# Patient Record
Sex: FEMALE | Race: BLACK OR AFRICAN AMERICAN | NOT HISPANIC OR LATINO | Employment: FULL TIME | URBAN - METROPOLITAN AREA
[De-identification: names, ages, dates, MRNs, and addresses within clinical notes are randomized per-mention and may not be internally consistent; named-entity substitution may affect disease eponyms.]

---

## 2017-01-25 ENCOUNTER — HOSPITAL ENCOUNTER (EMERGENCY)
Facility: HOSPITAL | Age: 27
Discharge: LEFT BEFORE DISCHARGE | End: 2017-01-25
Payer: MEDICAID

## 2017-01-25 VITALS
SYSTOLIC BLOOD PRESSURE: 112 MMHG | BODY MASS INDEX: 21.99 KG/M2 | HEART RATE: 115 BPM | OXYGEN SATURATION: 98 % | HEIGHT: 60 IN | DIASTOLIC BLOOD PRESSURE: 61 MMHG | WEIGHT: 112 LBS | RESPIRATION RATE: 20 BRPM | TEMPERATURE: 102 F

## 2017-01-25 LAB
FLUAV AG SPEC QL IA: NEGATIVE
FLUBV AG SPEC QL IA: NEGATIVE
SPECIMEN SOURCE: NORMAL

## 2017-01-25 PROCEDURE — 87400 INFLUENZA A/B EACH AG IA: CPT

## 2017-01-25 PROCEDURE — 99900041 HC LEFT WITHOUT BEING SEEN- EMERGENCY

## 2017-01-25 RX ORDER — ACETAMINOPHEN 500 MG
1000 TABLET ORAL
Status: DISCONTINUED | OUTPATIENT
Start: 2017-01-25 | End: 2017-01-26 | Stop reason: HOSPADM

## 2017-10-08 ENCOUNTER — HOSPITAL ENCOUNTER (EMERGENCY)
Facility: HOSPITAL | Age: 27
Discharge: HOME | End: 2017-10-08
Attending: FAMILY MEDICINE
Payer: MEDICAID

## 2017-10-08 VITALS
RESPIRATION RATE: 20 BRPM | OXYGEN SATURATION: 100 % | WEIGHT: 153 LBS | BODY MASS INDEX: 30.04 KG/M2 | HEART RATE: 90 BPM | TEMPERATURE: 98 F | SYSTOLIC BLOOD PRESSURE: 114 MMHG | HEIGHT: 60 IN | DIASTOLIC BLOOD PRESSURE: 75 MMHG

## 2017-10-08 DIAGNOSIS — O12.03 EDEMA DURING PREGNANCY IN THIRD TRIMESTER: Primary | ICD-10-CM

## 2017-10-08 PROCEDURE — 99283 EMERGENCY DEPT VISIT LOW MDM: CPT

## 2017-10-09 NOTE — ED PROVIDER NOTES
Encounter Date: 10/8/2017       History     Chief Complaint   Patient presents with    Leg Swelling     Pt states started with BLE swelling and lower back pain x 3 days.  Pt is 34 weeks gestation, states stands for 8 hours at work,  Pt reported symptoms to OB/GYN at last appt. Denies gush of fluids, states has been on antibiotics for bacterial vaginosis.      Mild edema at 34 weeks pregnant      The history is provided by the patient.   Leg Pain    The incident occurred at home. There was no injury mechanism. The incident occurred several days ago. The pain location is generalized. The pain is at a severity of 4/10. The pain has been constant since onset. The symptoms are aggravated by bearing weight.     Review of patient's allergies indicates:  No Known Allergies  History reviewed. No pertinent past medical history.  Past Surgical History:   Procedure Laterality Date    BREAST SURGERY       SECTION, CLASSIC       Family History   Problem Relation Age of Onset    Diabetes Mother     Hypertension Father     Diabetes Father      Social History   Substance Use Topics    Smoking status: Never Smoker    Smokeless tobacco: Never Used    Alcohol use No     Review of Systems   Constitutional: Negative for fever.   HENT: Negative for sore throat.    Respiratory: Negative for shortness of breath.    Cardiovascular: Negative for chest pain.   Gastrointestinal: Negative for nausea.   Genitourinary: Negative for dysuria.   Musculoskeletal: Negative for back pain.   Skin: Negative for rash.   Neurological: Negative for weakness.   Hematological: Does not bruise/bleed easily.   All other systems reviewed and are negative.      Physical Exam     Initial Vitals [10/08/17 1844]   BP Pulse Resp Temp SpO2   120/72 94 16 98.5 °F (36.9 °C) 98 %      MAP       88         Physical Exam    Constitutional: She appears well-developed.   HENT:   Head: Normocephalic and atraumatic.   Right Ear: External ear normal.   Left Ear:  External ear normal.   Nose: Nose normal.   Mouth/Throat: Oropharynx is clear and moist.   Eyes: Conjunctivae and EOM are normal. Pupils are equal, round, and reactive to light. Right eye exhibits no discharge. Left eye exhibits no discharge.   Neck: Normal range of motion. Neck supple. No tracheal deviation present.   Cardiovascular: Normal rate, regular rhythm and normal heart sounds.   No murmur heard.  Pulmonary/Chest: Breath sounds normal. No respiratory distress. She has no wheezes.   Abdominal: Soft. Bowel sounds are normal.   Musculoskeletal: She exhibits edema.   +2 edema bilateral lower legs   Neurological: She is alert and oriented to person, place, and time.   Skin: Skin is warm and dry.         ED Course   Procedures  Labs Reviewed - No data to display          Medical Decision Making:   Initial Assessment:   Patient sitting in no distress and pleasant. Patient has no other complaints other than documented.     Differential Diagnosis:   Edema  injury                   ED Course      Clinical Impression:   The encounter diagnosis was Edema during pregnancy in third trimester.                           Kodak Hurley MD  10/08/17 1919

## 2023-07-18 ENCOUNTER — HOSPITAL ENCOUNTER (OUTPATIENT)
Facility: HOSPITAL | Age: 33
Discharge: HOME | DRG: 690 | End: 2023-07-21
Attending: EMERGENCY MEDICINE | Admitting: HOSPITALIST
Payer: MEDICAID

## 2023-07-18 DIAGNOSIS — R50.9 FEVER, UNSPECIFIED FEVER CAUSE: ICD-10-CM

## 2023-07-18 DIAGNOSIS — N39.0 URINARY TRACT INFECTION WITH HEMATURIA, SITE UNSPECIFIED: ICD-10-CM

## 2023-07-18 DIAGNOSIS — R07.9 CHEST PAIN: ICD-10-CM

## 2023-07-18 DIAGNOSIS — R31.9 URINARY TRACT INFECTION WITH HEMATURIA, SITE UNSPECIFIED: ICD-10-CM

## 2023-07-18 DIAGNOSIS — A41.9 SEPSIS, DUE TO UNSPECIFIED ORGANISM, UNSPECIFIED WHETHER ACUTE ORGAN DYSFUNCTION PRESENT: Primary | ICD-10-CM

## 2023-07-18 PROBLEM — E87.6 HYPOKALEMIA: Status: ACTIVE | Noted: 2023-07-18

## 2023-07-18 LAB
ALBUMIN SERPL BCP-MCNC: 3.9 G/DL (ref 3.5–5.2)
ALP SERPL-CCNC: 109 U/L (ref 38–126)
ALT SERPL W/O P-5'-P-CCNC: 14 U/L (ref 10–44)
ANION GAP SERPL CALC-SCNC: 12 MMOL/L (ref 8–16)
AST SERPL-CCNC: 18 U/L (ref 15–46)
B-HCG UR QL: NEGATIVE
BACTERIA #/AREA URNS AUTO: ABNORMAL /HPF
BASOPHILS # BLD AUTO: 0.06 K/UL (ref 0–0.2)
BASOPHILS # BLD AUTO: 0.07 K/UL (ref 0–0.2)
BASOPHILS NFR BLD: 0.3 % (ref 0–1.9)
BASOPHILS NFR BLD: 0.4 % (ref 0–1.9)
BILIRUB SERPL-MCNC: 0.4 MG/DL (ref 0.1–1)
BILIRUB UR QL STRIP: NEGATIVE
CALCIUM SERPL-MCNC: 8.8 MG/DL (ref 8.7–10.5)
CHLORIDE SERPL-SCNC: 106 MMOL/L (ref 95–110)
CLARITY UR REFRACT.AUTO: ABNORMAL
CO2 SERPL-SCNC: 21 MMOL/L (ref 23–29)
COLOR UR AUTO: YELLOW
CREAT SERPL-MCNC: 0.68 MG/DL (ref 0.5–1.4)
CTP QC/QA: YES
DIFFERENTIAL METHOD: ABNORMAL
DIFFERENTIAL METHOD: ABNORMAL
EOSINOPHIL # BLD AUTO: 0 K/UL (ref 0–0.5)
EOSINOPHIL # BLD AUTO: 0 K/UL (ref 0–0.5)
EOSINOPHIL NFR BLD: 0 % (ref 0–8)
EOSINOPHIL NFR BLD: 0.1 % (ref 0–8)
ERYTHROCYTE [DISTWIDTH] IN BLOOD BY AUTOMATED COUNT: 16.1 % (ref 11.5–14.5)
ERYTHROCYTE [DISTWIDTH] IN BLOOD BY AUTOMATED COUNT: 16.2 % (ref 11.5–14.5)
EST. GFR  (NO RACE VARIABLE): >60 ML/MIN/1.73 M^2
GLUCOSE SERPL-MCNC: 124 MG/DL (ref 70–110)
GLUCOSE UR QL STRIP: NEGATIVE
HCT VFR BLD AUTO: 25 % (ref 37–48.5)
HCT VFR BLD AUTO: 27 % (ref 37–48.5)
HGB BLD-MCNC: 7.9 G/DL (ref 12–16)
HGB BLD-MCNC: 8.8 G/DL (ref 12–16)
HGB UR QL STRIP: ABNORMAL
HYALINE CASTS UR QL AUTO: 0 /LPF
IMM GRANULOCYTES # BLD AUTO: 0.11 K/UL (ref 0–0.04)
IMM GRANULOCYTES # BLD AUTO: 0.15 K/UL (ref 0–0.04)
IMM GRANULOCYTES NFR BLD AUTO: 0.6 % (ref 0–0.5)
IMM GRANULOCYTES NFR BLD AUTO: 0.8 % (ref 0–0.5)
INFLUENZA A, MOLECULAR: NEGATIVE
INFLUENZA B, MOLECULAR: NEGATIVE
KETONES UR QL STRIP: NEGATIVE
LACTATE SERPL-SCNC: 1.1 MMOL/L (ref 0.5–2.2)
LEUKOCYTE ESTERASE UR QL STRIP: NEGATIVE
LIPASE SERPL-CCNC: 15 U/L (ref 23–300)
LYMPHOCYTES # BLD AUTO: 0.8 K/UL (ref 1–4.8)
LYMPHOCYTES # BLD AUTO: 1.1 K/UL (ref 1–4.8)
LYMPHOCYTES NFR BLD: 4 % (ref 18–48)
LYMPHOCYTES NFR BLD: 5.4 % (ref 18–48)
MAGNESIUM SERPL-MCNC: 1.6 MG/DL (ref 1.6–2.6)
MCH RBC QN AUTO: 24.6 PG (ref 27–31)
MCH RBC QN AUTO: 25.1 PG (ref 27–31)
MCHC RBC AUTO-ENTMCNC: 31.6 G/DL (ref 32–36)
MCHC RBC AUTO-ENTMCNC: 32.6 G/DL (ref 32–36)
MCV RBC AUTO: 77 FL (ref 82–98)
MCV RBC AUTO: 78 FL (ref 82–98)
MICROSCOPIC COMMENT: ABNORMAL
MONOCYTES # BLD AUTO: 1.2 K/UL (ref 0.3–1)
MONOCYTES # BLD AUTO: 1.3 K/UL (ref 0.3–1)
MONOCYTES NFR BLD: 6.1 % (ref 4–15)
MONOCYTES NFR BLD: 6.5 % (ref 4–15)
NEUTROPHILS # BLD AUTO: 16.9 K/UL (ref 1.8–7.7)
NEUTROPHILS # BLD AUTO: 17.8 K/UL (ref 1.8–7.7)
NEUTROPHILS NFR BLD: 87 % (ref 38–73)
NEUTROPHILS NFR BLD: 88.8 % (ref 38–73)
NITRITE UR QL STRIP: NEGATIVE
NRBC BLD-RTO: 0 /100 WBC
NRBC BLD-RTO: 0 /100 WBC
PH UR STRIP: 6 [PH] (ref 5–8)
PLATELET # BLD AUTO: 458 K/UL (ref 150–450)
PLATELET # BLD AUTO: 513 K/UL (ref 150–450)
PMV BLD AUTO: 10.9 FL (ref 9.2–12.9)
PMV BLD AUTO: 11.1 FL (ref 9.2–12.9)
POTASSIUM SERPL-SCNC: 3.1 MMOL/L (ref 3.5–5.1)
POTASSIUM SERPL-SCNC: 3.6 MMOL/L (ref 3.5–5.1)
PROT SERPL-MCNC: 7.3 G/DL (ref 6–8.4)
PROT UR QL STRIP: ABNORMAL
RBC # BLD AUTO: 3.21 M/UL (ref 4–5.4)
RBC # BLD AUTO: 3.5 M/UL (ref 4–5.4)
RBC #/AREA URNS AUTO: 15 /HPF (ref 0–4)
SARS-COV-2 RDRP RESP QL NAA+PROBE: NEGATIVE
SODIUM SERPL-SCNC: 139 MMOL/L (ref 136–145)
SP GR UR STRIP: 1.01 (ref 1–1.03)
SPECIMEN SOURCE: NORMAL
URN SPEC COLLECT METH UR: ABNORMAL
UROBILINOGEN UR STRIP-ACNC: NEGATIVE EU/DL
UUN UR-MCNC: 3 MG/DL (ref 7–17)
WBC # BLD AUTO: 19.47 K/UL (ref 3.9–12.7)
WBC # BLD AUTO: 19.97 K/UL (ref 3.9–12.7)
WBC #/AREA URNS AUTO: 25 /HPF (ref 0–5)
WBC CLUMPS UR QL AUTO: ABNORMAL

## 2023-07-18 PROCEDURE — 87088 URINE BACTERIA CULTURE: CPT | Mod: ER | Performed by: EMERGENCY MEDICINE

## 2023-07-18 PROCEDURE — G0378 HOSPITAL OBSERVATION PER HR: HCPCS

## 2023-07-18 PROCEDURE — 81000 URINALYSIS NONAUTO W/SCOPE: CPT | Mod: ER | Performed by: EMERGENCY MEDICINE

## 2023-07-18 PROCEDURE — 99285 EMERGENCY DEPT VISIT HI MDM: CPT | Mod: 25,ER

## 2023-07-18 PROCEDURE — 25000003 PHARM REV CODE 250: Mod: ER | Performed by: EMERGENCY MEDICINE

## 2023-07-18 PROCEDURE — 83735 ASSAY OF MAGNESIUM: CPT | Performed by: NURSE PRACTITIONER

## 2023-07-18 PROCEDURE — 80053 COMPREHEN METABOLIC PANEL: CPT | Mod: ER | Performed by: EMERGENCY MEDICINE

## 2023-07-18 PROCEDURE — 63600175 PHARM REV CODE 636 W HCPCS: Mod: ER | Performed by: EMERGENCY MEDICINE

## 2023-07-18 PROCEDURE — 87086 URINE CULTURE/COLONY COUNT: CPT | Mod: ER | Performed by: EMERGENCY MEDICINE

## 2023-07-18 PROCEDURE — 96361 HYDRATE IV INFUSION ADD-ON: CPT | Mod: ER

## 2023-07-18 PROCEDURE — 81025 URINE PREGNANCY TEST: CPT | Mod: ER | Performed by: EMERGENCY MEDICINE

## 2023-07-18 PROCEDURE — 85025 COMPLETE CBC W/AUTO DIFF WBC: CPT | Mod: ER | Performed by: EMERGENCY MEDICINE

## 2023-07-18 PROCEDURE — 83605 ASSAY OF LACTIC ACID: CPT | Mod: ER | Performed by: EMERGENCY MEDICINE

## 2023-07-18 PROCEDURE — 87077 CULTURE AEROBIC IDENTIFY: CPT | Mod: ER | Performed by: EMERGENCY MEDICINE

## 2023-07-18 PROCEDURE — 11000001 HC ACUTE MED/SURG PRIVATE ROOM

## 2023-07-18 PROCEDURE — 36415 COLL VENOUS BLD VENIPUNCTURE: CPT | Performed by: NURSE PRACTITIONER

## 2023-07-18 PROCEDURE — 25500020 PHARM REV CODE 255: Mod: ER | Performed by: EMERGENCY MEDICINE

## 2023-07-18 PROCEDURE — 82728 ASSAY OF FERRITIN: CPT | Performed by: NURSE PRACTITIONER

## 2023-07-18 PROCEDURE — 83690 ASSAY OF LIPASE: CPT | Mod: ER | Performed by: EMERGENCY MEDICINE

## 2023-07-18 PROCEDURE — 96366 THER/PROPH/DIAG IV INF ADDON: CPT | Mod: ER

## 2023-07-18 PROCEDURE — 87502 INFLUENZA DNA AMP PROBE: CPT | Mod: ER | Performed by: EMERGENCY MEDICINE

## 2023-07-18 PROCEDURE — 87040 BLOOD CULTURE FOR BACTERIA: CPT | Mod: ER | Performed by: EMERGENCY MEDICINE

## 2023-07-18 PROCEDURE — 84466 ASSAY OF TRANSFERRIN: CPT | Performed by: NURSE PRACTITIONER

## 2023-07-18 PROCEDURE — 87186 SC STD MICRODIL/AGAR DIL: CPT | Mod: ER | Performed by: EMERGENCY MEDICINE

## 2023-07-18 PROCEDURE — 96365 THER/PROPH/DIAG IV INF INIT: CPT | Mod: 59,ER

## 2023-07-18 PROCEDURE — 25000003 PHARM REV CODE 250: Performed by: NURSE PRACTITIONER

## 2023-07-18 PROCEDURE — 84132 ASSAY OF SERUM POTASSIUM: CPT | Performed by: NURSE PRACTITIONER

## 2023-07-18 PROCEDURE — U0002 COVID-19 LAB TEST NON-CDC: HCPCS | Mod: ER | Performed by: EMERGENCY MEDICINE

## 2023-07-18 PROCEDURE — 85025 COMPLETE CBC W/AUTO DIFF WBC: CPT | Mod: 91,ER | Performed by: EMERGENCY MEDICINE

## 2023-07-18 RX ORDER — IBUPROFEN 600 MG/1
600 TABLET ORAL
Status: COMPLETED | OUTPATIENT
Start: 2023-07-18 | End: 2023-07-18

## 2023-07-18 RX ORDER — SODIUM CHLORIDE 9 MG/ML
1000 INJECTION, SOLUTION INTRAVENOUS
Status: COMPLETED | OUTPATIENT
Start: 2023-07-18 | End: 2023-07-18

## 2023-07-18 RX ORDER — SODIUM CHLORIDE 0.9 % (FLUSH) 0.9 %
3 SYRINGE (ML) INJECTION EVERY 12 HOURS PRN
Status: DISCONTINUED | OUTPATIENT
Start: 2023-07-18 | End: 2023-07-21 | Stop reason: HOSPADM

## 2023-07-18 RX ORDER — TALC
6 POWDER (GRAM) TOPICAL NIGHTLY PRN
Status: DISCONTINUED | OUTPATIENT
Start: 2023-07-18 | End: 2023-07-21 | Stop reason: HOSPADM

## 2023-07-18 RX ORDER — GLUCAGON 1 MG
1 KIT INJECTION
Status: DISCONTINUED | OUTPATIENT
Start: 2023-07-18 | End: 2023-07-21 | Stop reason: HOSPADM

## 2023-07-18 RX ORDER — POTASSIUM CHLORIDE 20 MEQ/1
40 TABLET, EXTENDED RELEASE ORAL
Status: COMPLETED | OUTPATIENT
Start: 2023-07-18 | End: 2023-07-18

## 2023-07-18 RX ORDER — ACETAMINOPHEN 325 MG/1
650 TABLET ORAL EVERY 4 HOURS PRN
Status: DISCONTINUED | OUTPATIENT
Start: 2023-07-18 | End: 2023-07-21 | Stop reason: HOSPADM

## 2023-07-18 RX ORDER — IBUPROFEN 200 MG
16 TABLET ORAL
Status: DISCONTINUED | OUTPATIENT
Start: 2023-07-18 | End: 2023-07-21 | Stop reason: HOSPADM

## 2023-07-18 RX ORDER — ONDANSETRON 2 MG/ML
4 INJECTION INTRAMUSCULAR; INTRAVENOUS EVERY 8 HOURS PRN
Status: DISCONTINUED | OUTPATIENT
Start: 2023-07-18 | End: 2023-07-21 | Stop reason: HOSPADM

## 2023-07-18 RX ORDER — IBUPROFEN 200 MG
24 TABLET ORAL
Status: DISCONTINUED | OUTPATIENT
Start: 2023-07-18 | End: 2023-07-21 | Stop reason: HOSPADM

## 2023-07-18 RX ORDER — POTASSIUM CHLORIDE 20 MEQ/1
40 TABLET, EXTENDED RELEASE ORAL ONCE
Status: COMPLETED | OUTPATIENT
Start: 2023-07-19 | End: 2023-07-19

## 2023-07-18 RX ORDER — CEPHALEXIN 500 MG/1
1000 CAPSULE ORAL EVERY 12 HOURS
Qty: 28 CAPSULE | Refills: 0 | Status: SHIPPED | OUTPATIENT
Start: 2023-07-18 | End: 2023-07-21 | Stop reason: HOSPADM

## 2023-07-18 RX ORDER — NALOXONE HCL 0.4 MG/ML
0.02 VIAL (ML) INJECTION
Status: DISCONTINUED | OUTPATIENT
Start: 2023-07-18 | End: 2023-07-21 | Stop reason: HOSPADM

## 2023-07-18 RX ORDER — SODIUM CHLORIDE 9 MG/ML
INJECTION, SOLUTION INTRAVENOUS
Status: COMPLETED | OUTPATIENT
Start: 2023-07-18 | End: 2023-07-18

## 2023-07-18 RX ADMIN — IOHEXOL 100 ML: 350 INJECTION, SOLUTION INTRAVENOUS at 03:07

## 2023-07-18 RX ADMIN — CEFTRIAXONE SODIUM 2 G: 2 INJECTION, POWDER, FOR SOLUTION INTRAMUSCULAR; INTRAVENOUS at 04:07

## 2023-07-18 RX ADMIN — VANCOMYCIN HYDROCHLORIDE 1500 MG: 1 INJECTION, POWDER, LYOPHILIZED, FOR SOLUTION INTRAVENOUS at 05:07

## 2023-07-18 RX ADMIN — IBUPROFEN 600 MG: 600 TABLET ORAL at 02:07

## 2023-07-18 RX ADMIN — POTASSIUM CHLORIDE 40 MEQ: 1500 TABLET, EXTENDED RELEASE ORAL at 08:07

## 2023-07-18 RX ADMIN — SODIUM CHLORIDE: 9 INJECTION, SOLUTION INTRAVENOUS at 02:07

## 2023-07-18 RX ADMIN — SODIUM CHLORIDE 1000 ML: 9 INJECTION, SOLUTION INTRAVENOUS at 05:07

## 2023-07-18 RX ADMIN — Medication 6 MG: at 10:07

## 2023-07-18 NOTE — ED PROVIDER NOTES
Encounter Date: 2023       History     Chief Complaint   Patient presents with    Fever    Fatigue     Headache, fever, weakness, emesis and leg numbness that started Saturday. Tylenol taken 2 hours ago     33-year-old female presenting with headache, fever, vomiting, weakness and numbness in her legs.  Patient says symptoms initially started Friday, got better but then returned yesterday more severely.  Patient describes her leg numbness as tingling with some pain associated with it.  Took Tylenol for fever.  Denies sore throat, chest pain, shortness of breath.  Patient says she is on her menstrual cycle now so has abdominal cramping.  Denies dysuria.  No vaginal discharge otherwise.  No rash.  No loss of control of bowels or bladder.  No back pain.    Review of patient's allergies indicates:  No Known Allergies  History reviewed. No pertinent past medical history.  Past Surgical History:   Procedure Laterality Date    BREAST SURGERY       SECTION, CLASSIC      TUBAL LIGATION       Family History   Problem Relation Age of Onset    Diabetes Mother     Hypertension Father     Diabetes Father      Social History     Tobacco Use    Smoking status: Never    Smokeless tobacco: Never   Substance Use Topics    Alcohol use: No    Drug use: No     Review of Systems   Constitutional:  Positive for fatigue and fever.   HENT:  Negative for sore throat.    Eyes:  Negative for pain.   Respiratory:  Negative for shortness of breath.    Cardiovascular:  Negative for chest pain.   Gastrointestinal:  Positive for abdominal pain. Negative for nausea and vomiting.   Genitourinary:  Negative for difficulty urinating.   Musculoskeletal:  Positive for myalgias. Negative for back pain and neck pain.   Neurological:  Positive for weakness, numbness and headaches.   Psychiatric/Behavioral:  Negative for confusion.      Physical Exam     Initial Vitals [23 1359]   BP Pulse Resp Temp SpO2   124/80 (!) 123 20 (!) 103.1 °F  (39.5 °C) 98 %      MAP       --         Physical Exam    Nursing note and vitals reviewed.  HENT:   Head: Atraumatic.   Mouth/Throat: Oropharynx is clear and moist.   Eyes: Conjunctivae and EOM are normal.   Neck: Neck supple.   Normal range of motion.  Cardiovascular:      Exam reveals no gallop and no friction rub.       No murmur heard.  Tachycardic   Pulmonary/Chest: Breath sounds normal. No respiratory distress.   Abdominal: Abdomen is soft. She exhibits no distension. There is abdominal tenderness (diffuse). There is no rebound.   Musculoskeletal:         General: No tenderness. Normal range of motion.      Cervical back: Normal range of motion and neck supple.     Neurological: She is alert and oriented to person, place, and time. She has normal strength. No cranial nerve deficit or sensory deficit.   Psychiatric: She has a normal mood and affect.       ED Course   Critical Care    Date/Time: 7/18/2023 5:50 PM  Performed by: Sammy Xavier MD  Authorized by: Sammy Xavier MD   Direct patient critical care time: 35 minutes  Ordering / reviewing critical care time: 10 minutes  Documentation critical care time: 10 minutes  Consulting other physicians critical care time: 5 minutes  Total critical care time (exclusive of procedural time) : 60 minutes  Critical care was necessary to treat or prevent imminent or life-threatening deterioration of the following conditions: shock, sepsis and renal failure.  Critical care was time spent personally by me on the following activities: development of treatment plan with patient or surrogate, evaluation of patient's response to treatment, examination of patient, obtaining history from patient or surrogate, ordering and performing treatments and interventions, ordering and review of laboratory studies, ordering and review of radiographic studies, pulse oximetry, re-evaluation of patient's condition and review of old charts.      Labs Reviewed   COMPREHENSIVE METABOLIC  PANEL - Abnormal; Notable for the following components:       Result Value    Potassium 3.1 (*)     CO2 21 (*)     Glucose 124 (*)     BUN 3 (*)     All other components within normal limits   CBC W/ AUTO DIFFERENTIAL - Abnormal; Notable for the following components:    WBC 19.97 (*)     RBC 3.50 (*)     Hemoglobin 8.8 (*)     Hematocrit 27.0 (*)     MCV 77 (*)     MCH 25.1 (*)     RDW 16.1 (*)     Platelets 513 (*)     Immature Granulocytes 0.6 (*)     Gran # (ANC) 17.8 (*)     Immature Grans (Abs) 0.11 (*)     Lymph # 0.8 (*)     Mono # 1.2 (*)     Gran % 88.8 (*)     Lymph % 4.0 (*)     All other components within normal limits   URINALYSIS, REFLEX TO URINE CULTURE - Abnormal; Notable for the following components:    Appearance, UA Hazy (*)     Protein, UA 1+ (*)     Occult Blood UA 2+ (*)     All other components within normal limits    Narrative:     Preferred Collection Type->Urine, Clean Catch  Specimen Source->Urine   LIPASE - Abnormal; Notable for the following components:    Lipase Result 15 (*)     All other components within normal limits   URINALYSIS MICROSCOPIC - Abnormal; Notable for the following components:    RBC, UA 15 (*)     WBC, UA 25 (*)     WBC Clumps, UA Occasional (*)     Bacteria Moderate (*)     All other components within normal limits    Narrative:     Preferred Collection Type->Urine, Clean Catch  Specimen Source->Urine   CBC W/ AUTO DIFFERENTIAL - Abnormal; Notable for the following components:    WBC 19.47 (*)     RBC 3.21 (*)     Hemoglobin 7.9 (*)     Hematocrit 25.0 (*)     MCV 78 (*)     MCH 24.6 (*)     MCHC 31.6 (*)     RDW 16.2 (*)     Platelets 458 (*)     Immature Granulocytes 0.8 (*)     Gran # (ANC) 16.9 (*)     Immature Grans (Abs) 0.15 (*)     Mono # 1.3 (*)     Gran % 87.0 (*)     Lymph % 5.4 (*)     All other components within normal limits   INFLUENZA A & B BY MOLECULAR   CULTURE, URINE   SARS-COV-2 RNA AMPLIFICATION, QUAL    Narrative:     Is the patient  symptomatic?->Yes   LACTIC ACID, PLASMA   POCT URINE PREGNANCY          Imaging Results              US Pelvis Complete Non OB (Final result)  Result time 07/18/23 17:01:39      Final result by Nury Courtney MD (Timothy) (07/18/23 17:01:39)                   Impression:      Partially collapse 1.5 cm follicle involving the right ovary.  Vascularity is present involving both ovaries.  O rad 1.      Electronically signed by: Nury Courtney MD  Date:    07/18/2023  Time:    17:01               Narrative:    EXAMINATION:  US PELVIS COMPLETE NON OB    CLINICAL HISTORY:  abnormal adnexa on CT, r/o TOA;    COMPARISON:  CT scan of the abdomen pelvis, 07/18/2023.    FINDINGS:  The uterus appears normal.  it measures 9.5 cm in length.  The endometrium measures 3 mm in thickness.    The right ovary measures 3.5 x 2.0 x 2.4 cm.  A 1.5 cm follicle is present.  May be partially collapsed.  Vascularity is present.    The left ovary measures 3.2 x 1.4 x 1.9 cm.  Vascularity is present.    Trace free fluid in the cul-de-sac.                                       CT Abdomen Pelvis With Contrast (Final result)  Result time 07/18/23 15:54:15      Final result by Jaime Eaton MD (07/18/23 15:54:15)                   Impression:      1. Slight prominence of the bilateral adnexal regions.  Consider pelvic ultrasound.  2. The no other potentially acute finding in the abdomen or pelvis.      Electronically signed by: Jaime Eaton  Date:    07/18/2023  Time:    15:54               Narrative:    EXAMINATION:  CT ABDOMEN PELVIS WITH CONTRAST    CLINICAL HISTORY:  RLQ abdominal pain (Age >= 14y);    COMPARISON:  Abdominal ultrasound May 19, 2013.    TECHNIQUE:  Axial CT images were obtained of the abdomen and pelvis following administration of 100 mL Omnipaque 350 intravenously.  Iterative reconstruction technique was used. The CT exam was performed using one or more of the following dose reduction techniques- Automated exposure  control, adjustment of the mA and/or kV according to patient size, and/or use of iterative reconstructed technique.  IV contrast was administered.    FINDINGS:  Heart: Normal in size. No pericardial effusion.    Lung Bases: Well aerated, without consolidation or pleural fluid.    Other: Bilateral breast implants.    Liver: Normal in size and attenuation, with no focal hepatic lesions.    Gallbladder: No calcified gallstones.    Bile Ducts: No evidence of dilated ducts.    Pancreas: No mass or peripancreatic fat stranding.    Spleen: Unremarkable.    Adrenals: Unremarkable.    Kidneys/ Ureters: Unremarkable.    Bladder: No evidence of wall thickening.    Reproductive organs: Slight prominence of the bilateral adnexal regions.    GI Tract/Mesentery: No evidence of bowel obstruction or inflammation.  The appendix has a normal appearance    Peritoneal Space: No ascites. No free air.    Retroperitoneum: No significant adenopathy.    Abdominal wall: Unremarkable.    Vasculature: No significant atherosclerosis or aneurysm.    Bones: No acute fracture.                                       X-Ray Chest AP Portable (Final result)  Result time 07/18/23 15:25:24      Final result by Jaime Eaton MD (07/18/23 15:25:24)                   Impression:      No acute finding in the chest.      Electronically signed by: Jaime Eaton  Date:    07/18/2023  Time:    15:25               Narrative:    EXAMINATION:  XR CHEST AP PORTABLE    CLINICAL HISTORY:  fever;    FINDINGS:  Comparison: July 24, 2012.    Mediastinal silhouette is within normal limits.  The lungs are clear.  No pneumothorax or pleural effusion.  No acute osseous finding.                                    X-Rays:   Independently Interpreted Readings:   Chest X-Ray: Normal heart size.  No infiltrates.  No acute abnormalities.   Medications   sodium chloride 0.9% flush 3 mL (has no administration in time range)   melatonin tablet 6 mg (6 mg Oral Given 7/18/23 8754)    ondansetron injection 4 mg (4 mg Intravenous Given 7/19/23 0014)   naloxone 0.4 mg/mL injection 0.02 mg (has no administration in time range)   glucose chewable tablet 16 g (has no administration in time range)   glucose chewable tablet 24 g (has no administration in time range)   glucagon (human recombinant) injection 1 mg (has no administration in time range)   dextrose 10% bolus 125 mL 125 mL (has no administration in time range)   dextrose 10% bolus 250 mL 250 mL (has no administration in time range)   acetaminophen tablet 650 mg (650 mg Oral Given 7/19/23 0010)   cefTRIAXone (ROCEPHIN) 1 g in dextrose 5 % in water (D5W) 5 % 100 mL IVPB (MB+) (has no administration in time range)   0.9%  NaCl infusion (0 mL/hr Intravenous Stopped 7/18/23 1548)   ibuprofen tablet 600 mg (600 mg Oral Given 7/18/23 1421)   cefTRIAXone (ROCEPHIN) 2 g in dextrose 5 % in water (D5W) 5 % 100 mL IVPB (MB+) (0 g Intravenous Stopped 7/18/23 1632)   vancomycin (VANCOCIN) 1,500 mg in dextrose 5 % (D5W) 250 mL IVPB (0 mg Intravenous Stopped 7/18/23 1929)   iohexoL (OMNIPAQUE 350) injection 100 mL (100 mLs Intravenous Given 7/18/23 1540)   0.9%  NaCl infusion (0 mLs Intravenous Stopped 7/18/23 1929)   potassium chloride SA CR tablet 40 mEq (40 mEq Oral Given 7/18/23 2002)   potassium chloride SA CR tablet 40 mEq (40 mEq Oral Given 7/19/23 0010)     Medical Decision Making:   Initial Assessment:   33-year-old female presenting with fever, fatigue, headache, vomiting and leg paresthesias with weakness.  Patient appears well on exam.  Lungs are clear.  Patient's neck is supple.  No meningismus.  Patient having lower extremity symptoms but has no back pain.  No clear etiology for symptoms.  Will initiate septic workup.           ED Course as of 07/19/23 0728   Tue Jul 18, 2023   1511 CBC Auto Differential(!)  WBC 19.97 [SN]   1522 Urinalysis, Reflex to Urine Culture Urine, Clean Catch(!)  Hematuria with evidence of UTI.  Empiric antibiotics  started [SN]   1529 Influenza A & B by Molecular [SN]   1529 COVID-19 Rapid Screening [SN]   1529 Preg Test, Ur: Negative [SN]   1529 Lipase Result(!): 15 [SN]   1529 Comprehensive Metabolic Panel(!) [SN]   1529 Lactate, Mervin: 1.1 [SN]   1601 CT abdomen and pelvis shows prominence of bilateral adnexa.  This may be related to patient's menstrual cycle however will obtain pelvic ultrasound to rule out tubo-ovarian abscess.  On re-evaluation, patient says she feels a lot better.  Her lower extremity symptoms have resolved.  Low suspicion for epidural abscess or cauda equina syndrome. [SN]   1705 Pelvic ultrasound shows no evidence of tubo-ovarian abscess or ovarian torsion. [SN]   1750 After 1.5 L of IV fluids, patient continues to feel much better.  Offered admission for further fluids and IV antibiotics however patient says she would like to go home.  Her heart rate and blood pressure have been stable.  Lactic acid within normal limits.  Discussed results thoroughly with the patient.  Discussed that she meets sepsis criteria.  Will repeat CBC.  Care turned over to Dr. Glez who will follow up on cbc and reassess. [SN]   1800 The patient was received from the off-going emergency room physician Dr MAGDALENO Xavier at 6:00PM.  All pertinent details presently available from the patient encounter were discussed along with the expected plan for disposition.   [ST]   1941 Chemistry notable for mild hypokalemia.  Or replacement has been ordered.  Patient continues to have marked leukocytosis despite IV fluids.  She has received 2 L of normal saline at this point along with vancomycin and Rocephin.  Initial lactate level unremarkable.  Blood pressure is stable and heart rate has improved.  Viral screens unremarkable.  Urinalysis clearly appears to identified the urine as a source for her inflammatory response.  Given fever, leukocytosis, and tachycardia with apparent infection, it does appear the patient has signs of sepsis though  she is improving quickly.    All available findings were reviewed with the patient/family in detail along with the indications for hospitalization in order to receive cardiac monitoring, IV antibiotics, and IVF.  All remaining questions and concerns were addressed at this time and the patient/family communicates understanding and agrees to proceed accordingly.  Similarly, all pertinent details of the encounter were discussed with ROSANA Mccain on behalf of Dr Sommers who agrees to receive the patient at [facility] for further care as outlined above.  The patient will be transferred by EMS secondary to a need for ongoing cardiac monitoring and IVF en route. [ST]      ED Course User Index  [SN] Sammy Xavier MD  [ST] Onofre Glez MD                   Clinical Impression:   Final diagnoses:  [N39.0, R31.9] Urinary tract infection with hematuria, site unspecified  [A41.9] Sepsis, due to unspecified organism, unspecified whether acute organ dysfunction present (Primary)  [R50.9] Fever, unspecified fever cause        ED Disposition Condition    Observation Stable                Sammy Xavier MD  07/18/23 1810       Sammy Xavier MD  07/19/23 6003

## 2023-07-18 NOTE — PHARMACY MED REC
"  [facility]           Pharmacy  Admission Medication History     The home medication history was taken by Audrey Herbert.      Medication history obtained from Medications listed below were obtained from: Patient/family.Patient states she is not taking any medications    Based on information gathered for medication list, you may go to "Admission" then "Reconcile Home Medications" tabs to review and/or act upon those items.     The home medication list has been updated by the Pharmacy department.   Please read ALL comments highlighted in yellow.   Please address this information as you see fit.    Feel free to contact us if you have any questions or require assistance.    The medications listed below were removed from the home medication list.  Please reorder if appropriate:    Patient reports NOT TAKING the following medication(s):  Tylenol 500mg  Ibuprofen 200mg      No current facility-administered medications on file prior to encounter.     Current Outpatient Medications on File Prior to Encounter   Medication Sig Dispense Refill       Please address this information as you see fit.  Feel free to contact us if you have any questions or require assistance.    Audrey Herbert  [phone]                .        "

## 2023-07-19 PROBLEM — D62 ACUTE BLOOD LOSS ANEMIA: Status: ACTIVE | Noted: 2023-07-19

## 2023-07-19 LAB
ANION GAP SERPL CALC-SCNC: 4 MMOL/L (ref 8–16)
BASOPHILS # BLD AUTO: 0.07 K/UL (ref 0–0.2)
BASOPHILS NFR BLD: 0.4 % (ref 0–1.9)
BUN SERPL-MCNC: 4 MG/DL (ref 6–20)
CALCIUM SERPL-MCNC: 8.3 MG/DL (ref 8.7–10.5)
CHLORIDE SERPL-SCNC: 112 MMOL/L (ref 95–110)
CO2 SERPL-SCNC: 20 MMOL/L (ref 23–29)
CREAT SERPL-MCNC: 0.7 MG/DL (ref 0.5–1.4)
DIFFERENTIAL METHOD: ABNORMAL
EOSINOPHIL # BLD AUTO: 0 K/UL (ref 0–0.5)
EOSINOPHIL NFR BLD: 0.1 % (ref 0–8)
ERYTHROCYTE [DISTWIDTH] IN BLOOD BY AUTOMATED COUNT: 17 % (ref 11.5–14.5)
EST. GFR  (NO RACE VARIABLE): >60 ML/MIN/1.73 M^2
FERRITIN SERPL-MCNC: 38 NG/ML (ref 20–300)
GLUCOSE SERPL-MCNC: 108 MG/DL (ref 70–110)
HCT VFR BLD AUTO: 24.1 % (ref 37–48.5)
HGB BLD-MCNC: 7.8 G/DL (ref 12–16)
IMM GRANULOCYTES # BLD AUTO: 0.12 K/UL (ref 0–0.04)
IMM GRANULOCYTES NFR BLD AUTO: 0.6 % (ref 0–0.5)
IRON SERPL-MCNC: <10 UG/DL (ref 30–160)
LYMPHOCYTES # BLD AUTO: 1.2 K/UL (ref 1–4.8)
LYMPHOCYTES NFR BLD: 6.2 % (ref 18–48)
MCH RBC QN AUTO: 24.6 PG (ref 27–31)
MCHC RBC AUTO-ENTMCNC: 32.4 G/DL (ref 32–36)
MCV RBC AUTO: 76 FL (ref 82–98)
MONOCYTES # BLD AUTO: 1.3 K/UL (ref 0.3–1)
MONOCYTES NFR BLD: 6.9 % (ref 4–15)
NEUTROPHILS # BLD AUTO: 15.9 K/UL (ref 1.8–7.7)
NEUTROPHILS NFR BLD: 85.8 % (ref 38–73)
NRBC BLD-RTO: 0 /100 WBC
PLATELET # BLD AUTO: 430 K/UL (ref 150–450)
PMV BLD AUTO: 10.7 FL (ref 9.2–12.9)
POTASSIUM SERPL-SCNC: 4 MMOL/L (ref 3.5–5.1)
RBC # BLD AUTO: 3.17 M/UL (ref 4–5.4)
SATURATED IRON: ABNORMAL % (ref 20–50)
SODIUM SERPL-SCNC: 136 MMOL/L (ref 136–145)
TOTAL IRON BINDING CAPACITY: 361 UG/DL (ref 250–450)
TRANSFERRIN SERPL-MCNC: 244 MG/DL (ref 200–375)
WBC # BLD AUTO: 18.5 K/UL (ref 3.9–12.7)

## 2023-07-19 PROCEDURE — 80048 BASIC METABOLIC PNL TOTAL CA: CPT | Performed by: NURSE PRACTITIONER

## 2023-07-19 PROCEDURE — 87086 URINE CULTURE/COLONY COUNT: CPT | Performed by: NURSE PRACTITIONER

## 2023-07-19 PROCEDURE — 96375 TX/PRO/DX INJ NEW DRUG ADDON: CPT

## 2023-07-19 PROCEDURE — 11000001 HC ACUTE MED/SURG PRIVATE ROOM

## 2023-07-19 PROCEDURE — G0378 HOSPITAL OBSERVATION PER HR: HCPCS

## 2023-07-19 PROCEDURE — 96361 HYDRATE IV INFUSION ADD-ON: CPT

## 2023-07-19 PROCEDURE — 36415 COLL VENOUS BLD VENIPUNCTURE: CPT | Performed by: NURSE PRACTITIONER

## 2023-07-19 PROCEDURE — 25000003 PHARM REV CODE 250: Performed by: NURSE PRACTITIONER

## 2023-07-19 PROCEDURE — 63600175 PHARM REV CODE 636 W HCPCS: Performed by: NURSE PRACTITIONER

## 2023-07-19 PROCEDURE — 96367 TX/PROPH/DG ADDL SEQ IV INF: CPT

## 2023-07-19 PROCEDURE — 85025 COMPLETE CBC W/AUTO DIFF WBC: CPT | Performed by: NURSE PRACTITIONER

## 2023-07-19 RX ORDER — IBUPROFEN 400 MG/1
400 TABLET ORAL EVERY 6 HOURS PRN
Status: DISCONTINUED | OUTPATIENT
Start: 2023-07-19 | End: 2023-07-21 | Stop reason: HOSPADM

## 2023-07-19 RX ORDER — SODIUM CHLORIDE 9 MG/ML
INJECTION, SOLUTION INTRAVENOUS CONTINUOUS
Status: DISCONTINUED | OUTPATIENT
Start: 2023-07-19 | End: 2023-07-21 | Stop reason: HOSPADM

## 2023-07-19 RX ADMIN — POTASSIUM CHLORIDE 40 MEQ: 1500 TABLET, EXTENDED RELEASE ORAL at 12:07

## 2023-07-19 RX ADMIN — ACETAMINOPHEN 650 MG: 325 TABLET ORAL at 10:07

## 2023-07-19 RX ADMIN — SODIUM CHLORIDE: 9 INJECTION, SOLUTION INTRAVENOUS at 10:07

## 2023-07-19 RX ADMIN — IBUPROFEN 400 MG: 400 TABLET ORAL at 11:07

## 2023-07-19 RX ADMIN — ONDANSETRON 4 MG: 2 INJECTION INTRAMUSCULAR; INTRAVENOUS at 12:07

## 2023-07-19 RX ADMIN — ACETAMINOPHEN 650 MG: 325 TABLET ORAL at 09:07

## 2023-07-19 RX ADMIN — CEFTRIAXONE 1 G: 1 INJECTION, POWDER, FOR SOLUTION INTRAMUSCULAR; INTRAVENOUS at 03:07

## 2023-07-19 RX ADMIN — SODIUM CHLORIDE: 9 INJECTION, SOLUTION INTRAVENOUS at 11:07

## 2023-07-19 RX ADMIN — ACETAMINOPHEN 650 MG: 325 TABLET ORAL at 12:07

## 2023-07-19 NOTE — HPI
Miguel Floyd is a 33-year-old female without significant PMH. She presented to the ED with headache, fever, vomiting, weakness and numbness in her legs, and b/l flank pain.  Patient says symptoms initially started Friday, got better,  but then returned yesterday more severely.  Patient describes her leg numbness as tingling with some pain associated with it.  She took Tylenol for fever of 103F.  Denies sore throat, chest pain, shortness of breath.  Patient says she is on her menstrual cycle now so has abdominal cramping.  No vaginal discharge otherwise. Denies dysuria or other urinary symptoms.  No rash.  No loss of control of bowels or bladder.

## 2023-07-19 NOTE — NURSING
[facility] RN met with [facility] regarding [facility] follow up upon discharge to prevent hospital readmission. Pt AAOx4 and pleasant. Pt agreeable to hospital follow up .RN informed pt of scheduled appointment and that appointment will also appear on d/c AVS. Patient informed to bring all medication bottles to [facility] follow up appointment.  [facility] information handout, appointment letter and folder provided to patient. Pt states she has transportation to appointment.    Future Appointments   Date Time Provider Department Center   7/31/2023  3:00 PM Alley Bradford MD [facility] ALEXANDR Webber RN  [facility]  [phone]

## 2023-07-19 NOTE — H&P
[facility]  History & Physical    Patient Name: Miguel Floyd  MRN: [id number]  Patient Class: OP- Observation  Admission Date: 2023  Attending Physician: Emre Sommers MD   Primary Care Provider: Roselyn Mckeon NP         Patient information was obtained from patient and ER records.     Subjective:     Principal Problem:UTI (urinary tract infection)    Chief Complaint:   Chief Complaint   Patient presents with    Fever    Fatigue     Headache, fever, weakness, emesis and leg numbness that started Saturday. Tylenol taken 2 hours ago        HPI: Miguel Floyd is a 33-year-old female without significant PMH. She presented to the ED with headache, fever, vomiting, weakness and numbness in her legs, and b/l flank pain.  Patient says symptoms initially started Friday, got better,  but then returned yesterday more severely.  Patient describes her leg numbness as tingling with some pain associated with it.  She took Tylenol for fever of 103F.  Denies sore throat, chest pain, shortness of breath.  Patient says she is on her menstrual cycle now so has abdominal cramping.  No vaginal discharge otherwise. Denies dysuria or other urinary symptoms.  No rash.  No loss of control of bowels or bladder.          History reviewed. No pertinent past medical history.    Past Surgical History:   Procedure Laterality Date    BREAST SURGERY       SECTION, CLASSIC      TUBAL LIGATION         Review of patient's allergies indicates:  No Known Allergies    No current facility-administered medications on file prior to encounter.     Current Outpatient Medications on File Prior to Encounter   Medication Sig    [DISCONTINUED] acetaminophen (TYLENOL) 500 MG tablet Take 2 tablets (1,000 mg total) by mouth 3 (three) times daily as needed for Pain.    [DISCONTINUED] ibuprofen (ADVIL,MOTRIN) 200 MG tablet Take 2 tablets (400 mg total) by mouth every 6 (six) hours as needed for Pain.     Family  History       Problem Relation (Age of Onset)    Diabetes Mother, Father    Hypertension Father          Tobacco Use    Smoking status: Never    Smokeless tobacco: Never   Substance and Sexual Activity    Alcohol use: No    Drug use: No    Sexual activity: Not on file     Review of Systems   Constitutional:  Positive for chills and fever.   HENT:  Negative for trouble swallowing.    Respiratory:  Negative for shortness of breath.    Cardiovascular:  Negative for chest pain.   Gastrointestinal:  Positive for nausea and vomiting.   Genitourinary:  Positive for flank pain and pelvic pain. Negative for dysuria.   Musculoskeletal:  Negative for gait problem.   Skin:  Negative for color change.   Neurological:  Positive for weakness, numbness (BLE) and headaches.   Psychiatric/Behavioral:  Negative for agitation and confusion. The patient is not nervous/anxious.    Objective:     Vital Signs (Most Recent):  Temp: 98.5 °F (36.9 °C) (07/18/23 2139)  Pulse: 78 (07/18/23 2139)  Resp: 20 (07/18/23 2139)  BP: 130/80 (07/18/23 2139)  SpO2: 100 % (07/18/23 2139) Vital Signs (24h Range):  Temp:  [98.4 °F (36.9 °C)-103.1 °F (39.5 °C)] 98.5 °F (36.9 °C)  Pulse:  [] 78  Resp:  [20] 20  SpO2:  [98 %-100 %] 100 %  BP: (120-130)/(77-83) 130/80     Weight: 61.8 kg (136 lb 3.9 oz)  Body mass index is 25.74 kg/m².     Physical Exam  Vitals and nursing note reviewed.   Constitutional:       General: She is not in acute distress.     Appearance: She is ill-appearing.   HENT:      Head: Normocephalic.      Mouth/Throat:      Mouth: Mucous membranes are moist.   Eyes:      Pupils: Pupils are equal, round, and reactive to light.   Cardiovascular:      Rate and Rhythm: Normal rate and regular rhythm.      Pulses: Normal pulses.      Heart sounds: Normal heart sounds.   Pulmonary:      Effort: Pulmonary effort is normal. No respiratory distress.      Breath sounds: Normal breath sounds. No wheezing, rhonchi or rales.   Abdominal:       General: Bowel sounds are normal. There is no distension.      Palpations: Abdomen is soft.      Tenderness: There is no abdominal tenderness. There is right CVA tenderness and left CVA tenderness. There is no guarding or rebound.   Musculoskeletal:      Right lower leg: No edema.      Left lower leg: No edema.   Skin:     General: Skin is warm.   Neurological:      Mental Status: She is alert and oriented to person, place, and time.   Psychiatric:         Mood and Affect: Mood normal.         Behavior: Behavior normal.         Thought Content: Thought content normal.         Judgment: Judgment normal.            CRANIAL NERVES     CN III, IV, VI   Pupils are equal, round, and reactive to light.     Significant Labs: All pertinent labs within the past 24 hours have been reviewed.    Significant Imaging: I have reviewed all pertinent imaging results/findings within the past 24 hours.    Assessment/Plan:     * UTI (urinary tract infection)  · Associated with suprapubic and flank pain, fever, and chills  · WBC 19.47  · UA positive for 2+ occult blood, 25 WBC, moderate bacteria, and occasional WBC clumps  · Initially started on vanc and rocephin in ED  · Will continue rocephin      Acute blood loss anemia  · H/H 7.9/25.0  · Patient reports being on her menstrual cycle but also likely dilutional  · Transfuse for hgb <7  · Monitor    Hypokalemia  · K 3.1  · 3.6 s/p replacement  · Goal K 4        VTE Risk Mitigation (From admission, onward)         Ordered     IP VTE LOW RISK PATIENT  Once         07/18/23 2138     Place sequential compression device  Until discontinued         07/18/23 2138                     On 07/19/2023, patient should be placed in hospital observation services under my care in collaboration with Emre Sommers MD.      Kaya Mccain NP  Department of Hospital Medicine  [facility]

## 2023-07-19 NOTE — PROGRESS NOTES
[facility]  Progress Note    Patient Name: Miguel Floyd  MRN: [id number]  Patient Class: OP- Observation   Admission Date: 7/18/2023  Length of Stay: 0 days  Attending Physician: Larry Finley, *  Primary Care Provider: Roselyn Mckeon NP        Subjective:     Principal Problem:UTI (urinary tract infection)        HPI:  Miguel Floyd is a 33-year-old female without significant PMH. She presented to the ED with headache, fever, vomiting, weakness and numbness in her legs, and b/l flank pain.  Patient says symptoms initially started Friday, got better,  but then returned yesterday more severely.  Patient describes her leg numbness as tingling with some pain associated with it.  She took Tylenol for fever of 103F.  Denies sore throat, chest pain, shortness of breath.  Patient says she is on her menstrual cycle now so has abdominal cramping.  No vaginal discharge otherwise. Denies dysuria or other urinary symptoms.  No rash.  No loss of control of bowels or bladder.          Overview/Hospital Course:  Patient monitored closely during hospital. She was initiated on IV rocephin for UTI. CT renal protocol demonstrated mild stranding consistent with pyelonephritis.  She received antipyretics for fever.       Interval History: Patient febrile with temp 102.6 this am. She is receiving tylenol and motrin for headache.  Still with leukocytosis 18.5, hgb 7.8  Reviewed CT renal which demonstrates Mild nonspecific perinephric fat stranding consistent with UTI/pyelonephritis.       Review of Systems   Constitutional:  Positive for chills, fatigue and fever.   Genitourinary:  Negative for dysuria and urgency.   Neurological:  Positive for headaches.   Objective:     Vital Signs (Most Recent):  Temp: 97.9 °F (36.6 °C) (07/19/23 1128)  Pulse: 69 (07/19/23 1128)  Resp: 18 (07/19/23 1128)  BP: 105/63 (07/19/23 1128)  SpO2: 99 % (07/19/23 1128) Vital Signs (24h Range):  Temp:  [97 °F (36.1  °C)-103.1 °F (39.5 °C)] 97.9 °F (36.6 °C)  Pulse:  [] 69  Resp:  [16-20] 18  SpO2:  [98 %-100 %] 99 %  BP: (104-130)/(54-83) 105/63     Weight: 61.8 kg (136 lb 3.9 oz)  Body mass index is 25.74 kg/m².    Intake/Output Summary (Last 24 hours) at 7/19/2023 1338  Last data filed at 7/18/2023 1929  Gross per 24 hour   Intake 1250 ml   Output --   Net 1250 ml         Physical Exam  Vitals and nursing note reviewed.   Constitutional:       Appearance: Normal appearance.   Cardiovascular:      Rate and Rhythm: Normal rate and regular rhythm.   Pulmonary:      Effort: Pulmonary effort is normal. No respiratory distress.   Abdominal:      Tenderness: There is no abdominal tenderness. There is no guarding.   Neurological:      Mental Status: She is alert.           Significant Labs: All pertinent labs within the past 24 hours have been reviewed.  CBC:   Recent Labs   Lab 07/18/23  1428 07/18/23  1826 07/19/23  0403   WBC 19.97* 19.47* 18.50*   HGB 8.8* 7.9* 7.8*   HCT 27.0* 25.0* 24.1*   * 458* 430     CMP:   Recent Labs   Lab 07/18/23  1428 07/18/23  2306 07/19/23  0402     --  136   K 3.1* 3.6 4.0     --  112*   CO2 21*  --  20*   *  --  108   BUN 3*  --  4*   CREATININE 0.68  --  0.7   CALCIUM 8.8  --  8.3*   PROT 7.3  --   --    ALBUMIN 3.9  --   --    BILITOT 0.4  --   --    ALKPHOS 109  --   --    AST 18  --   --    ALT 14  --   --    ANIONGAP 12  --  4*       Significant Imaging: I have reviewed all pertinent imaging results/findings within the past 24 hours.      Assessment/Plan:      * UTI (urinary tract infection)  · Suspect pyelonephritis. Associated with suprapubic and flank pain, fever, and chills  · WBC 19.47-> 18  · UA positive for 2+ occult blood, 25 WBC, moderate bacteria, and occasional WBC clumps  · Initially started on vanc and rocephin in ED  · Will continue rocephin  · Urine cx pending  Add IVF    Acute blood loss anemia    · Patient reports being on her menstrual cycle  but also likely dilutional  · Transfuse for hgb <7  · Monitor  · Add po fergon daily    Hypokalemia  Replace with oral supplementation. Improved.         VTE Risk Mitigation (From admission, onward)         Ordered     IP VTE LOW RISK PATIENT  Once         07/18/23 2138     Place sequential compression device  Until discontinued         07/18/23 2138                Discharge Planning   SIL:      Code Status: Full Code   Is the patient medically ready for discharge?:     Reason for patient still in hospital (select all that apply): Patient trending condition, Laboratory test and Treatment  Discharge Plan A: Home with family                  Penny Cohn NP  Department of Hospital Medicine   [facility]

## 2023-07-19 NOTE — PLAN OF CARE
SW met with pt at bedside to complete assessment. Pt is AxO X3 and able to verbally answer assessment questions.  Pt confirmed demographic information. Pt reports address on file is mailing address and at time of discharge pt will physically be returning to [facility] in [place]. Pt reports living at home with her children and feeling safe. Pt has mother as emergency contact. Pt reports while at home being independent of ADL's and no DME in use. Pt drives. Pt will have family transport home. SW updated whiteboard with [facility] name and contact information. SW confirmed pt understanding of Observation unit and expected discharge plan. SW will continue to follow pt throughout care and assist with any discharge needs.         07/19/23 1143   Discharge Planning   Assessment Type Discharge Planning Brief Assessment   Resource/Environmental Concerns none   Support Systems Family members;Parent   Equipment Currently Used at Home none   Current Living Arrangements home   Patient/Family Anticipates Transition to home with family   Patient/Family Anticipated Services at Transition none   DME Needed Upon Discharge  none   Discharge Plan A Home with family       Future Appointments   Date Time Provider Department Center   7/31/2023  3:00 PM Alley Bradford MD [facility] Case Management  [phone]

## 2023-07-19 NOTE — SUBJECTIVE & OBJECTIVE
History reviewed. No pertinent past medical history.    Past Surgical History:   Procedure Laterality Date    BREAST SURGERY       SECTION, CLASSIC      TUBAL LIGATION         Review of patient's allergies indicates:  No Known Allergies    No current facility-administered medications on file prior to encounter.     Current Outpatient Medications on File Prior to Encounter   Medication Sig    [DISCONTINUED] acetaminophen (TYLENOL) 500 MG tablet Take 2 tablets (1,000 mg total) by mouth 3 (three) times daily as needed for Pain.    [DISCONTINUED] ibuprofen (ADVIL,MOTRIN) 200 MG tablet Take 2 tablets (400 mg total) by mouth every 6 (six) hours as needed for Pain.     Family History       Problem Relation (Age of Onset)    Diabetes Mother, Father    Hypertension Father          Tobacco Use    Smoking status: Never    Smokeless tobacco: Never   Substance and Sexual Activity    Alcohol use: No    Drug use: No    Sexual activity: Not on file     Review of Systems   Constitutional:  Positive for chills and fever.   HENT:  Negative for trouble swallowing.    Respiratory:  Negative for shortness of breath.    Cardiovascular:  Negative for chest pain.   Gastrointestinal:  Positive for nausea and vomiting.   Genitourinary:  Positive for flank pain and pelvic pain. Negative for dysuria.   Musculoskeletal:  Negative for gait problem.   Skin:  Negative for color change.   Neurological:  Positive for weakness, numbness (BLE) and headaches.   Psychiatric/Behavioral:  Negative for agitation and confusion. The patient is not nervous/anxious.    Objective:     Vital Signs (Most Recent):  Temp: 98.5 °F (36.9 °C) (23)  Pulse: 78 (23)  Resp: 20 (23)  BP: 130/80 (23)  SpO2: 100 % (23) Vital Signs (24h Range):  Temp:  [98.4 °F (36.9 °C)-103.1 °F (39.5 °C)] 98.5 °F (36.9 °C)  Pulse:  [] 78  Resp:  [20] 20  SpO2:  [98 %-100 %] 100 %  BP: (120-130)/(77-83) 130/80     Weight:  61.8 kg (136 lb 3.9 oz)  Body mass index is 25.74 kg/m².     Physical Exam  Vitals and nursing note reviewed.   Constitutional:       General: She is not in acute distress.     Appearance: She is ill-appearing.   HENT:      Head: Normocephalic.      Mouth/Throat:      Mouth: Mucous membranes are moist.   Eyes:      Pupils: Pupils are equal, round, and reactive to light.   Cardiovascular:      Rate and Rhythm: Normal rate and regular rhythm.      Pulses: Normal pulses.      Heart sounds: Normal heart sounds.   Pulmonary:      Effort: Pulmonary effort is normal. No respiratory distress.      Breath sounds: Normal breath sounds. No wheezing, rhonchi or rales.   Abdominal:      General: Bowel sounds are normal. There is no distension.      Palpations: Abdomen is soft.      Tenderness: There is no abdominal tenderness. There is right CVA tenderness and left CVA tenderness. There is no guarding or rebound.   Musculoskeletal:      Right lower leg: No edema.      Left lower leg: No edema.   Skin:     General: Skin is warm.   Neurological:      Mental Status: She is alert and oriented to person, place, and time.   Psychiatric:         Mood and Affect: Mood normal.         Behavior: Behavior normal.         Thought Content: Thought content normal.         Judgment: Judgment normal.            CRANIAL NERVES     CN III, IV, VI   Pupils are equal, round, and reactive to light.     Significant Labs: All pertinent labs within the past 24 hours have been reviewed.    Significant Imaging: I have reviewed all pertinent imaging results/findings within the past 24 hours.

## 2023-07-19 NOTE — ED PROVIDER NOTES
"ED Provider Note - 7/18/2023    Initial Vitals [07/18/23 1359]   BP Pulse Resp Temp SpO2   124/80 (!) 123 20 (!) 103.1 °F (39.5 °C) 98 %      MAP       --         Vitals:    07/18/23 1359 07/18/23 1503 07/18/23 1519 07/18/23 1802   BP: 124/80 129/83  120/77   Pulse: (!) 123 99  76   Resp: 20      Temp: (!) 103.1 °F (39.5 °C)  99.9 °F (37.7 °C)    TempSrc: Oral  Oral    SpO2: 98% 99%  100%   Weight: 56.7 kg (125 lb)      Height: 5' 1" (1.549 m)        ED Course   Procedures                   MDM      LABS     Labs Reviewed   COMPREHENSIVE METABOLIC PANEL - Abnormal; Notable for the following components:       Result Value    Potassium 3.1 (*)     CO2 21 (*)     Glucose 124 (*)     BUN 3 (*)     All other components within normal limits   CBC W/ AUTO DIFFERENTIAL - Abnormal; Notable for the following components:    WBC 19.97 (*)     RBC 3.50 (*)     Hemoglobin 8.8 (*)     Hematocrit 27.0 (*)     MCV 77 (*)     MCH 25.1 (*)     RDW 16.1 (*)     Platelets 513 (*)     Immature Granulocytes 0.6 (*)     Gran # (ANC) 17.8 (*)     Immature Grans (Abs) 0.11 (*)     Lymph # 0.8 (*)     Mono # 1.2 (*)     Gran % 88.8 (*)     Lymph % 4.0 (*)     All other components within normal limits   URINALYSIS, REFLEX TO URINE CULTURE - Abnormal; Notable for the following components:    Appearance, UA Hazy (*)     Protein, UA 1+ (*)     Occult Blood UA 2+ (*)     All other components within normal limits    Narrative:     Preferred Collection Type->Urine, Clean Catch  Specimen Source->Urine   LIPASE - Abnormal; Notable for the following components:    Lipase Result 15 (*)     All other components within normal limits   URINALYSIS MICROSCOPIC - Abnormal; Notable for the following components:    RBC, UA 15 (*)     WBC, UA 25 (*)     WBC Clumps, UA Occasional (*)     Bacteria Moderate (*)     All other components within normal limits    Narrative:     Preferred Collection Type->Urine, Clean Catch  Specimen Source->Urine   CBC W/ AUTO " DIFFERENTIAL - Abnormal; Notable for the following components:    WBC 19.47 (*)     RBC 3.21 (*)     Hemoglobin 7.9 (*)     Hematocrit 25.0 (*)     MCV 78 (*)     MCH 24.6 (*)     MCHC 31.6 (*)     RDW 16.2 (*)     Platelets 458 (*)     Immature Granulocytes 0.8 (*)     Gran # (ANC) 16.9 (*)     Immature Grans (Abs) 0.15 (*)     Mono # 1.3 (*)     Gran % 87.0 (*)     Lymph % 5.4 (*)     All other components within normal limits   INFLUENZA A & B BY MOLECULAR   CULTURE, BLOOD   CULTURE, BLOOD   CULTURE, URINE   SARS-COV-2 RNA AMPLIFICATION, QUAL    Narrative:     Is the patient symptomatic?->Yes   LACTIC ACID, PLASMA   POCT URINE PREGNANCY         Imaging     Imaging Results              US Pelvis Complete Non OB (Final result)  Result time 07/18/23 17:01:39      Final result by Nury Courtney MD (Timothy) (07/18/23 17:01:39)                   Impression:      Partially collapse 1.5 cm follicle involving the right ovary.  Vascularity is present involving both ovaries.  O rad 1.      Electronically signed by: Nury Courtney MD  Date:    07/18/2023  Time:    17:01               Narrative:    EXAMINATION:  US PELVIS COMPLETE NON OB    CLINICAL HISTORY:  abnormal adnexa on CT, r/o TOA;    COMPARISON:  CT scan of the abdomen pelvis, 07/18/2023.    FINDINGS:  The uterus appears normal.  it measures 9.5 cm in length.  The endometrium measures 3 mm in thickness.    The right ovary measures 3.5 x 2.0 x 2.4 cm.  A 1.5 cm follicle is present.  May be partially collapsed.  Vascularity is present.    The left ovary measures 3.2 x 1.4 x 1.9 cm.  Vascularity is present.    Trace free fluid in the cul-de-sac.                                       CT Abdomen Pelvis With Contrast (Final result)  Result time 07/18/23 15:54:15      Final result by Jaime Eaton MD (07/18/23 15:54:15)                   Impression:      1. Slight prominence of the bilateral adnexal regions.  Consider pelvic ultrasound.  2. The no other potentially  acute finding in the abdomen or pelvis.      Electronically signed by: Jaime Eaton  Date:    07/18/2023  Time:    15:54               Narrative:    EXAMINATION:  CT ABDOMEN PELVIS WITH CONTRAST    CLINICAL HISTORY:  RLQ abdominal pain (Age >= 14y);    COMPARISON:  Abdominal ultrasound May 19, 2013.    TECHNIQUE:  Axial CT images were obtained of the abdomen and pelvis following administration of 100 mL Omnipaque 350 intravenously.  Iterative reconstruction technique was used. The CT exam was performed using one or more of the following dose reduction techniques- Automated exposure control, adjustment of the mA and/or kV according to patient size, and/or use of iterative reconstructed technique.  IV contrast was administered.    FINDINGS:  Heart: Normal in size. No pericardial effusion.    Lung Bases: Well aerated, without consolidation or pleural fluid.    Other: Bilateral breast implants.    Liver: Normal in size and attenuation, with no focal hepatic lesions.    Gallbladder: No calcified gallstones.    Bile Ducts: No evidence of dilated ducts.    Pancreas: No mass or peripancreatic fat stranding.    Spleen: Unremarkable.    Adrenals: Unremarkable.    Kidneys/ Ureters: Unremarkable.    Bladder: No evidence of wall thickening.    Reproductive organs: Slight prominence of the bilateral adnexal regions.    GI Tract/Mesentery: No evidence of bowel obstruction or inflammation.  The appendix has a normal appearance    Peritoneal Space: No ascites. No free air.    Retroperitoneum: No significant adenopathy.    Abdominal wall: Unremarkable.    Vasculature: No significant atherosclerosis or aneurysm.    Bones: No acute fracture.                                       X-Ray Chest AP Portable (Final result)  Result time 07/18/23 15:25:24      Final result by Jaime Eaton MD (07/18/23 15:25:24)                   Impression:      No acute finding in the chest.      Electronically signed by: Jaime  Demaris  Date:    07/18/2023  Time:    15:25               Narrative:    EXAMINATION:  XR CHEST AP PORTABLE    CLINICAL HISTORY:  fever;    FINDINGS:  Comparison: July 24, 2012.    Mediastinal silhouette is within normal limits.  The lungs are clear.  No pneumothorax or pleural effusion.  No acute osseous finding.                                         EKG        ED Management/Discussion     Medications   potassium chloride SA CR tablet 40 mEq (has no administration in time range)   0.9%  NaCl infusion (0 mL/hr Intravenous Stopped 7/18/23 1548)   ibuprofen tablet 600 mg (600 mg Oral Given 7/18/23 1421)   cefTRIAXone (ROCEPHIN) 2 g in dextrose 5 % in water (D5W) 5 % 100 mL IVPB (MB+) (0 g Intravenous Stopped 7/18/23 1632)   vancomycin (VANCOCIN) 1,500 mg in dextrose 5 % (D5W) 250 mL IVPB (0 mg Intravenous Stopped 7/18/23 1929)   iohexoL (OMNIPAQUE 350) injection 100 mL (100 mLs Intravenous Given 7/18/23 1540)   0.9%  NaCl infusion (0 mLs Intravenous Stopped 7/18/23 1929)            ED Course as of 07/18/23 1943   Tue Jul 18, 2023   1511 CBC Auto Differential(!)  WBC 19.97 [SN]   1522 Urinalysis, Reflex to Urine Culture Urine, Clean Catch(!)  Hematuria with evidence of UTI.  Empiric antibiotics started [SN]   1529 Influenza A & B by Molecular [SN]   1529 COVID-19 Rapid Screening [SN]   1529 Preg Test, Ur: Negative [SN]   1529 Lipase Result(!): 15 [SN]   1529 Comprehensive Metabolic Panel(!) [SN]   1529 Lactate, Mervin: 1.1 [SN]   1601 CT abdomen and pelvis shows prominence of bilateral adnexa.  This may be related to patient's menstrual cycle however will obtain pelvic ultrasound to rule out tubo-ovarian abscess.  On re-evaluation, patient says she feels a lot better.  Her lower extremity symptoms have resolved.  Low suspicion for epidural abscess or cauda equina syndrome. [SN]   1705 Pelvic ultrasound shows no evidence of tubo-ovarian abscess or ovarian torsion. [SN]   1750 After 1.5 L of IV fluids, patient continues  to feel much better.  Offered admission for further fluids and IV antibiotics however patient says she would like to go home.  Her heart rate and blood pressure have been stable.  Lactic acid within normal limits.  Discussed results thoroughly with the patient.  Discussed that she meets sepsis criteria.  Will repeat CBC.  Care turned over to Dr. Glez who will follow up on cbc and reassess. [SN]   1800 The patient was received from the off-going emergency room physician Dr MAGDALENO Xavier at 6:00PM.  All pertinent details presently available from the patient encounter were discussed along with the expected plan for disposition.   [ST]   1941 Chemistry notable for mild hypokalemia.  Or replacement has been ordered.  Patient continues to have marked leukocytosis despite IV fluids.  She has received 2 L of normal saline at this point along with vancomycin and Rocephin.  Initial lactate level unremarkable.  Blood pressure is stable and heart rate has improved.  Viral screens unremarkable.  Urinalysis clearly appears to identified the urine as a source for her inflammatory response.  Given fever, leukocytosis, and tachycardia with apparent infection, it does appear the patient has signs of sepsis though she is improving quickly.    All available findings were reviewed with the patient/family in detail along with the indications for hospitalization in order to receive cardiac monitoring, IV antibiotics, and IVF.  All remaining questions and concerns were addressed at this time and the patient/family communicates understanding and agrees to proceed accordingly.  Similarly, all pertinent details of the encounter were discussed with ROSANA Mccain on behalf of Dr Sommers who agrees to receive the patient at [facility] for further care as outlined above.  The patient will be transferred by EMS secondary to a need for ongoing cardiac monitoring and IVF en route. [ST]      ED Course User Index  [SN] Sammy Xavier MD  [ST]  Onofre Glez MD         The patient's list of active medical problems, social history, medications, and allergies as documented per RN staff has been reviewed.               Referrals:  No orders of the defined types were placed in this encounter.      CLINICAL IMPRESSION    ICD-10-CM ICD-9-CM   1. Sepsis, due to unspecified organism, unspecified whether acute organ dysfunction present  A41.9 038.9     995.91   2. Urinary tract infection with hematuria, site unspecified  N39.0 599.0    R31.9 599.70   3. Fever, unspecified fever cause  R50.9 780.60          ED Disposition Condition    Observation Stable               Onofre Glez MD  07/18/23 1941

## 2023-07-19 NOTE — ASSESSMENT & PLAN NOTE
· Suspect pyelonephritis. Associated with suprapubic and flank pain, fever, and chills  · WBC 19.47-> 18  · UA positive for 2+ occult blood, 25 WBC, moderate bacteria, and occasional WBC clumps  · Initially started on vanc and rocephin in ED  · Will continue rocephin  · Urine cx pending  Add IVF

## 2023-07-19 NOTE — SUBJECTIVE & OBJECTIVE
Interval History: Patient febrile with temp 102.6 this am. She is receiving tylenol and motrin for headache.  Still with leukocytosis 18.5, hgb 7.8  Reviewed CT renal which demonstrates Mild nonspecific perinephric fat stranding consistent with UTI/pyelonephritis.       Review of Systems   Constitutional:  Positive for chills, fatigue and fever.   Genitourinary:  Negative for dysuria and urgency.   Neurological:  Positive for headaches.   Objective:     Vital Signs (Most Recent):  Temp: 97.9 °F (36.6 °C) (07/19/23 1128)  Pulse: 69 (07/19/23 1128)  Resp: 18 (07/19/23 1128)  BP: 105/63 (07/19/23 1128)  SpO2: 99 % (07/19/23 1128) Vital Signs (24h Range):  Temp:  [97 °F (36.1 °C)-103.1 °F (39.5 °C)] 97.9 °F (36.6 °C)  Pulse:  [] 69  Resp:  [16-20] 18  SpO2:  [98 %-100 %] 99 %  BP: (104-130)/(54-83) 105/63     Weight: 61.8 kg (136 lb 3.9 oz)  Body mass index is 25.74 kg/m².    Intake/Output Summary (Last 24 hours) at 7/19/2023 1338  Last data filed at 7/18/2023 1929  Gross per 24 hour   Intake 1250 ml   Output --   Net 1250 ml         Physical Exam  Vitals and nursing note reviewed.   Constitutional:       Appearance: Normal appearance.   Cardiovascular:      Rate and Rhythm: Normal rate and regular rhythm.   Pulmonary:      Effort: Pulmonary effort is normal. No respiratory distress.   Abdominal:      Tenderness: There is no abdominal tenderness. There is no guarding.   Neurological:      Mental Status: She is alert.           Significant Labs: All pertinent labs within the past 24 hours have been reviewed.  CBC:   Recent Labs   Lab 07/18/23  1428 07/18/23  1826 07/19/23  0403   WBC 19.97* 19.47* 18.50*   HGB 8.8* 7.9* 7.8*   HCT 27.0* 25.0* 24.1*   * 458* 430     CMP:   Recent Labs   Lab 07/18/23  1428 07/18/23  2306 07/19/23  0402     --  136   K 3.1* 3.6 4.0     --  112*   CO2 21*  --  20*   *  --  108   BUN 3*  --  4*   CREATININE 0.68  --  0.7   CALCIUM 8.8  --  8.3*   PROT 7.3  --    --    ALBUMIN 3.9  --   --    BILITOT 0.4  --   --    ALKPHOS 109  --   --    AST 18  --   --    ALT 14  --   --    ANIONGAP 12  --  4*       Significant Imaging: I have reviewed all pertinent imaging results/findings within the past 24 hours.

## 2023-07-19 NOTE — HOSPITAL COURSE
Patient monitored closely during hospital. She was initiated on IV rocephin for UTI. CT renal protocol demonstrated mild stranding consistent with pyelonephritis.  She received antipyretics for fever and IV fluids. Urine culture obtained.  7/20---fevers noted over night--will cont abx. Follow cultures.   7/21--noted with a low grade temp of 99.5 but overall doing well. Cultures reviewed and noted with cipro sensitive. Will DC with oral cipro 500 mg bid x10 days. Will setup priority care appointment outpatient.     UTI (urinary tract infection)  Suspect pyelonephritis. Associated with suprapubic and flank pain, fever, and chills  WBC 19.47-> 18-> 14-> 9  UA positive for 2+ occult blood, 25 WBC, moderate bacteria, and occasional WBC clumps  Initially started on vanc and rocephin in ED  Stopped rocephin after sensitivities reviewed--will DC home with Cipro 500 mg PO bid        Acute blood loss anemia     Patient reported being on her menstrual cycle per chart review  No excessive NSAID use at home  Denies blood in stools  Checking iron, TIBC, and ferritin   Transfuse for hgb <7  Monitor  Add po fergon daily on DC

## 2023-07-19 NOTE — ASSESSMENT & PLAN NOTE
· H/H 7.9/25.0  · Patient reports being on her menstrual cycle but also likely dilutional  · Transfuse for hgb <7  · Monitor

## 2023-07-19 NOTE — ASSESSMENT & PLAN NOTE
· Associated with suprapubic and flank pain, fever, and chills  · WBC 19.47  · UA positive for 2+ occult blood, 25 WBC, moderate bacteria, and occasional WBC clumps  · Initially started on vanc and rocephin in ED  · Will continue rocephin

## 2023-07-19 NOTE — ED NOTES
Pt left necklace behind. Attempted to call number in chart but it does not work. Informed receiving nurse Rose at [facility] who says she will inform patient.

## 2023-07-19 NOTE — ASSESSMENT & PLAN NOTE
· Patient reports being on her menstrual cycle but also likely dilutional  · Transfuse for hgb <7  · Monitor  · Add po fergon daily

## 2023-07-19 NOTE — PLAN OF CARE
Problem: Adult Inpatient Plan of Care  Goal: Plan of Care Review  Outcome: Ongoing, Progressing   Patient is alert, oriented X4. Care plan explained to patient and her boyfriend. Both verbalized understanding.     On room air, o2 sat maintain 99%, no respiratory distress noted. On cardiac monitor, running normal sinus to sinus tachycardiac(100's)    Pt had one episode of vomiting, PRN zofran given. Patient running fever 102, body shivering, PRN tylenol given. Insomnia managed with Prn melatonin. Due medications given.     Maintain fall risk precaution, bed in lowest position, bed alarm on. Call light, personal items in reach. Instructed patient call for help as needed. Will continue to monitor.

## 2023-07-19 NOTE — PLAN OF CARE
Problem: Adult Inpatient Plan of Care  Goal: Plan of Care Review  Outcome: Ongoing, Progressing  Goal: Patient-Specific Goal (Individualized)  Outcome: Ongoing, Progressing  Goal: Absence of Hospital-Acquired Illness or Injury  Outcome: Ongoing, Progressing  Goal: Optimal Comfort and Wellbeing  Outcome: Ongoing, Progressing  Goal: Readiness for Transition of Care  Outcome: Ongoing, Progressing     Problem: Adjustment to Illness (Sepsis/Septic Shock)  Goal: Optimal Coping  Outcome: Ongoing, Progressing     Problem: Bleeding (Sepsis/Septic Shock)  Goal: Absence of Bleeding  Outcome: Ongoing, Progressing     Problem: Glycemic Control Impaired (Sepsis/Septic Shock)  Goal: Blood Glucose Level Within Desired Range  Outcome: Ongoing, Progressing     Problem: Infection Progression (Sepsis/Septic Shock)  Goal: Absence of Infection Signs and Symptoms  Outcome: Ongoing, Progressing     Problem: Nutrition Impaired (Sepsis/Septic Shock)  Goal: Optimal Nutrition Intake  Outcome: Ongoing, Progressing     Problem: UTI (Urinary Tract Infection)  Goal: Improved Infection Symptoms  Outcome: Ongoing, Progressing     Problem: Nausea and Vomiting  Goal: Fluid and Electrolyte Balance  Outcome: Ongoing, Progressing     Problem: Fall Injury Risk  Goal: Absence of Fall and Fall-Related Injury  Outcome: Ongoing, Progressing     Problem: Fatigue  Goal: Improved Activity Tolerance  Outcome: Ongoing, Progressing

## 2023-07-20 PROBLEM — N12 PYELONEPHRITIS: Status: ACTIVE | Noted: 2023-07-18

## 2023-07-20 LAB
ANION GAP SERPL CALC-SCNC: 8 MMOL/L (ref 8–16)
BACTERIA UR CULT: ABNORMAL
BASOPHILS # BLD AUTO: 0.06 K/UL (ref 0–0.2)
BASOPHILS NFR BLD: 0.4 % (ref 0–1.9)
BUN SERPL-MCNC: 5 MG/DL (ref 6–20)
CALCIUM SERPL-MCNC: 8.4 MG/DL (ref 8.7–10.5)
CHLORIDE SERPL-SCNC: 113 MMOL/L (ref 95–110)
CO2 SERPL-SCNC: 20 MMOL/L (ref 23–29)
CREAT SERPL-MCNC: 0.7 MG/DL (ref 0.5–1.4)
DIFFERENTIAL METHOD: ABNORMAL
EOSINOPHIL # BLD AUTO: 0 K/UL (ref 0–0.5)
EOSINOPHIL NFR BLD: 0.3 % (ref 0–8)
ERYTHROCYTE [DISTWIDTH] IN BLOOD BY AUTOMATED COUNT: 17.1 % (ref 11.5–14.5)
EST. GFR  (NO RACE VARIABLE): >60 ML/MIN/1.73 M^2
GLUCOSE SERPL-MCNC: 100 MG/DL (ref 70–110)
HCT VFR BLD AUTO: 25 % (ref 37–48.5)
HGB BLD-MCNC: 7.9 G/DL (ref 12–16)
IMM GRANULOCYTES # BLD AUTO: 0.08 K/UL (ref 0–0.04)
IMM GRANULOCYTES NFR BLD AUTO: 0.6 % (ref 0–0.5)
LYMPHOCYTES # BLD AUTO: 1.7 K/UL (ref 1–4.8)
LYMPHOCYTES NFR BLD: 12 % (ref 18–48)
MCH RBC QN AUTO: 24.2 PG (ref 27–31)
MCHC RBC AUTO-ENTMCNC: 31.6 G/DL (ref 32–36)
MCV RBC AUTO: 77 FL (ref 82–98)
MONOCYTES # BLD AUTO: 1.3 K/UL (ref 0.3–1)
MONOCYTES NFR BLD: 8.9 % (ref 4–15)
NEUTROPHILS # BLD AUTO: 11.1 K/UL (ref 1.8–7.7)
NEUTROPHILS NFR BLD: 77.8 % (ref 38–73)
NRBC BLD-RTO: 0 /100 WBC
PLATELET # BLD AUTO: 469 K/UL (ref 150–450)
PMV BLD AUTO: 10.7 FL (ref 9.2–12.9)
POTASSIUM SERPL-SCNC: 4.3 MMOL/L (ref 3.5–5.1)
RBC # BLD AUTO: 3.26 M/UL (ref 4–5.4)
SODIUM SERPL-SCNC: 141 MMOL/L (ref 136–145)
WBC # BLD AUTO: 14.22 K/UL (ref 3.9–12.7)

## 2023-07-20 PROCEDURE — 85025 COMPLETE CBC W/AUTO DIFF WBC: CPT | Performed by: NURSE PRACTITIONER

## 2023-07-20 PROCEDURE — G0378 HOSPITAL OBSERVATION PER HR: HCPCS

## 2023-07-20 PROCEDURE — 36415 COLL VENOUS BLD VENIPUNCTURE: CPT | Performed by: NURSE PRACTITIONER

## 2023-07-20 PROCEDURE — 96366 THER/PROPH/DIAG IV INF ADDON: CPT

## 2023-07-20 PROCEDURE — 25000003 PHARM REV CODE 250: Performed by: NURSE PRACTITIONER

## 2023-07-20 PROCEDURE — 80048 BASIC METABOLIC PNL TOTAL CA: CPT | Performed by: NURSE PRACTITIONER

## 2023-07-20 PROCEDURE — 63600175 PHARM REV CODE 636 W HCPCS: Performed by: NURSE PRACTITIONER

## 2023-07-20 PROCEDURE — 96361 HYDRATE IV INFUSION ADD-ON: CPT

## 2023-07-20 PROCEDURE — 11000001 HC ACUTE MED/SURG PRIVATE ROOM

## 2023-07-20 RX ORDER — SENNOSIDES 8.6 MG/1
8.6 TABLET ORAL DAILY PRN
Status: DISCONTINUED | OUTPATIENT
Start: 2023-07-20 | End: 2023-07-21 | Stop reason: HOSPADM

## 2023-07-20 RX ADMIN — SODIUM CHLORIDE: 9 INJECTION, SOLUTION INTRAVENOUS at 06:07

## 2023-07-20 RX ADMIN — SODIUM CHLORIDE: 9 INJECTION, SOLUTION INTRAVENOUS at 02:07

## 2023-07-20 RX ADMIN — SODIUM CHLORIDE: 9 INJECTION, SOLUTION INTRAVENOUS at 11:07

## 2023-07-20 RX ADMIN — CEFTRIAXONE 1 G: 1 INJECTION, POWDER, FOR SOLUTION INTRAMUSCULAR; INTRAVENOUS at 03:07

## 2023-07-20 RX ADMIN — ACETAMINOPHEN 650 MG: 325 TABLET ORAL at 02:07

## 2023-07-20 RX ADMIN — ACETAMINOPHEN 650 MG: 325 TABLET ORAL at 11:07

## 2023-07-20 NOTE — PROGRESS NOTES
[facility] Medicine  Progress Note    Patient Name: Miguel Floyd  MRN: [id number]  Patient Class: IP- Inpatient   Admission Date: 7/18/2023  Length of Stay: 1 days  Attending Physician: Larry Finley, *  Primary Care Provider: Roselyn Mckeon NP        Subjective:     Principal Problem:Pyelonephritis        HPI:  Miguel Floyd is a 33-year-old female without significant PMH. She presented to the ED with headache, fever, vomiting, weakness and numbness in her legs, and b/l flank pain.  Patient says symptoms initially started Friday, got better,  but then returned yesterday more severely.  Patient describes her leg numbness as tingling with some pain associated with it.  She took Tylenol for fever of 103F.  Denies sore throat, chest pain, shortness of breath.  Patient says she is on her menstrual cycle now so has abdominal cramping.  No vaginal discharge otherwise. Denies dysuria or other urinary symptoms.  No rash.  No loss of control of bowels or bladder.          Overview/Hospital Course:  Patient monitored closely during hospital. She was initiated on IV rocephin for UTI. CT renal protocol demonstrated mild stranding consistent with pyelonephritis.  She received antipyretics for fever and IV fluids. Urine culture obtained.  7/20---fevers noted over night--will cont abx. Follow cultures.       Interval History: Patient febrile with temp 102.5 this am.     Still with leukocytosis 14.2    Reviewed CT renal which demonstrates Mild nonspecific perinephric fat stranding consistent with UTI/pyelonephritis.       Review of Systems   Constitutional:  Positive for chills, fatigue and fever.   Genitourinary:  Negative for dysuria and urgency.   Neurological:  Negative for headaches.   Psychiatric/Behavioral:  Negative for confusion.    Objective:     Vital Signs (Most Recent):  Temp: 97.8 °F (36.6 °C) (07/20/23 1140)  Pulse: 77 (07/20/23 1212)  Resp: 18 (07/20/23 1140)  BP: (!) 124/92 (07/20/23  1140)  SpO2: 99 % (07/20/23 1140) Vital Signs (24h Range):  Temp:  [96.5 °F (35.8 °C)-102.5 °F (39.2 °C)] 97.8 °F (36.6 °C)  Pulse:  [] 77  Resp:  [18-20] 18  SpO2:  [98 %-100 %] 99 %  BP: (115-127)/(56-92) 124/92     Weight: 61.8 kg (136 lb 3.9 oz)  Body mass index is 25.74 kg/m².  No intake or output data in the 24 hours ending 07/20/23 1241        Physical Exam  Vitals and nursing note reviewed.   Constitutional:       Appearance: Normal appearance. She is ill-appearing.   Cardiovascular:      Rate and Rhythm: Normal rate and regular rhythm.   Pulmonary:      Effort: Pulmonary effort is normal. No respiratory distress.   Abdominal:      Tenderness: There is no abdominal tenderness. There is no guarding.   Neurological:      Mental Status: She is alert.           Significant Labs: All pertinent labs within the past 24 hours have been reviewed.  CBC:   Recent Labs   Lab 07/18/23  1826 07/19/23  0403 07/20/23  0331   WBC 19.47* 18.50* 14.22*   HGB 7.9* 7.8* 7.9*   HCT 25.0* 24.1* 25.0*   * 430 469*       CMP:   Recent Labs   Lab 07/18/23  1428 07/18/23  2306 07/19/23  0402 07/20/23  0331     --  136 141   K 3.1* 3.6 4.0 4.3     --  112* 113*   CO2 21*  --  20* 20*   *  --  108 100   BUN 3*  --  4* 5*   CREATININE 0.68  --  0.7 0.7   CALCIUM 8.8  --  8.3* 8.4*   PROT 7.3  --   --   --    ALBUMIN 3.9  --   --   --    BILITOT 0.4  --   --   --    ALKPHOS 109  --   --   --    AST 18  --   --   --    ALT 14  --   --   --    ANIONGAP 12  --  4* 8         Significant Imaging: I have reviewed all pertinent imaging results/findings within the past 24 hours.      Assessment/Plan:      * Pyelonephritis  · Suspect pyelonephritis. Associated with suprapubic and flank pain, fever, and chills  · WBC 19.47-> 18->14.2  · UA positive for 2+ occult blood, 25 WBC, moderate bacteria, and occasional WBC clumps  · Initially started on vanc and rocephin in ED  · Will continue rocephin  · Urine cx  pending  Add IVF    Acute blood loss anemia    · Patient reports being on her menstrual cycle but also likely dilutional  · Transfuse for hgb <7  · Monitor  · Add po fergon daily    Hypokalemia  Replace with oral supplementation. Improved.         VTE Risk Mitigation (From admission, onward)         Ordered     IP VTE LOW RISK PATIENT  Once         07/18/23 2138     Place sequential compression device  Until discontinued         07/18/23 2138                Discharge Planning   SIL:      Code Status: Full Code   Is the patient medically ready for discharge?:     Reason for patient still in hospital (select all that apply): Patient trending condition, Laboratory test, Treatment and Imaging  Discharge Plan A: Home with family                  Cheryl Salazar NP  Department of Hospital Medicine   [facility]

## 2023-07-20 NOTE — ASSESSMENT & PLAN NOTE
· Suspect pyelonephritis. Associated with suprapubic and flank pain, fever, and chills  · WBC 19.47-> 18->14.2  · UA positive for 2+ occult blood, 25 WBC, moderate bacteria, and occasional WBC clumps  · Initially started on vanc and rocephin in ED  · Will continue rocephin  · Urine cx pending  Add IVF

## 2023-07-20 NOTE — SUBJECTIVE & OBJECTIVE
Interval History: Patient febrile with temp 102.5 this am.     Still with leukocytosis 14.2    Reviewed CT renal which demonstrates Mild nonspecific perinephric fat stranding consistent with UTI/pyelonephritis.       Review of Systems   Constitutional:  Positive for chills, fatigue and fever.   Genitourinary:  Negative for dysuria and urgency.   Neurological:  Negative for headaches.   Psychiatric/Behavioral:  Negative for confusion.    Objective:     Vital Signs (Most Recent):  Temp: 97.8 °F (36.6 °C) (07/20/23 1140)  Pulse: 77 (07/20/23 1212)  Resp: 18 (07/20/23 1140)  BP: (!) 124/92 (07/20/23 1140)  SpO2: 99 % (07/20/23 1140) Vital Signs (24h Range):  Temp:  [96.5 °F (35.8 °C)-102.5 °F (39.2 °C)] 97.8 °F (36.6 °C)  Pulse:  [] 77  Resp:  [18-20] 18  SpO2:  [98 %-100 %] 99 %  BP: (115-127)/(56-92) 124/92     Weight: 61.8 kg (136 lb 3.9 oz)  Body mass index is 25.74 kg/m².  No intake or output data in the 24 hours ending 07/20/23 1241        Physical Exam  Vitals and nursing note reviewed.   Constitutional:       Appearance: Normal appearance. She is ill-appearing.   Cardiovascular:      Rate and Rhythm: Normal rate and regular rhythm.   Pulmonary:      Effort: Pulmonary effort is normal. No respiratory distress.   Abdominal:      Tenderness: There is no abdominal tenderness. There is no guarding.   Neurological:      Mental Status: She is alert.           Significant Labs: All pertinent labs within the past 24 hours have been reviewed.  CBC:   Recent Labs   Lab 07/18/23  1826 07/19/23  0403 07/20/23  0331   WBC 19.47* 18.50* 14.22*   HGB 7.9* 7.8* 7.9*   HCT 25.0* 24.1* 25.0*   * 430 469*       CMP:   Recent Labs   Lab 07/18/23  1428 07/18/23  2306 07/19/23  0402 07/20/23  0331     --  136 141   K 3.1* 3.6 4.0 4.3     --  112* 113*   CO2 21*  --  20* 20*   *  --  108 100   BUN 3*  --  4* 5*   CREATININE 0.68  --  0.7 0.7   CALCIUM 8.8  --  8.3* 8.4*   PROT 7.3  --   --   --     ALBUMIN 3.9  --   --   --    BILITOT 0.4  --   --   --    ALKPHOS 109  --   --   --    AST 18  --   --   --    ALT 14  --   --   --    ANIONGAP 12  --  4* 8         Significant Imaging: I have reviewed all pertinent imaging results/findings within the past 24 hours.

## 2023-07-21 VITALS
BODY MASS INDEX: 25.72 KG/M2 | WEIGHT: 136.25 LBS | DIASTOLIC BLOOD PRESSURE: 73 MMHG | RESPIRATION RATE: 18 BRPM | TEMPERATURE: 97 F | OXYGEN SATURATION: 99 % | HEART RATE: 69 BPM | HEIGHT: 61 IN | SYSTOLIC BLOOD PRESSURE: 127 MMHG

## 2023-07-21 PROBLEM — E87.6 HYPOKALEMIA: Status: RESOLVED | Noted: 2023-07-18 | Resolved: 2023-07-21

## 2023-07-21 LAB
ANION GAP SERPL CALC-SCNC: 6 MMOL/L (ref 8–16)
BACTERIA UR CULT: NO GROWTH
BASOPHILS # BLD AUTO: 0.06 K/UL (ref 0–0.2)
BASOPHILS NFR BLD: 0.6 % (ref 0–1.9)
BUN SERPL-MCNC: 4 MG/DL (ref 6–20)
CALCIUM SERPL-MCNC: 8.7 MG/DL (ref 8.7–10.5)
CHLORIDE SERPL-SCNC: 112 MMOL/L (ref 95–110)
CO2 SERPL-SCNC: 21 MMOL/L (ref 23–29)
CREAT SERPL-MCNC: 0.7 MG/DL (ref 0.5–1.4)
DIFFERENTIAL METHOD: ABNORMAL
EOSINOPHIL # BLD AUTO: 0.1 K/UL (ref 0–0.5)
EOSINOPHIL NFR BLD: 0.9 % (ref 0–8)
ERYTHROCYTE [DISTWIDTH] IN BLOOD BY AUTOMATED COUNT: 16.9 % (ref 11.5–14.5)
EST. GFR  (NO RACE VARIABLE): >60 ML/MIN/1.73 M^2
FERRITIN SERPL-MCNC: 46 NG/ML (ref 20–300)
GLUCOSE SERPL-MCNC: 94 MG/DL (ref 70–110)
HCT VFR BLD AUTO: 24.3 % (ref 37–48.5)
HGB BLD-MCNC: 7.8 G/DL (ref 12–16)
IMM GRANULOCYTES # BLD AUTO: 0.05 K/UL (ref 0–0.04)
IMM GRANULOCYTES NFR BLD AUTO: 0.5 % (ref 0–0.5)
IRON SERPL-MCNC: 14 UG/DL (ref 30–160)
LYMPHOCYTES # BLD AUTO: 1.8 K/UL (ref 1–4.8)
LYMPHOCYTES NFR BLD: 18.8 % (ref 18–48)
MCH RBC QN AUTO: 24.3 PG (ref 27–31)
MCHC RBC AUTO-ENTMCNC: 32.1 G/DL (ref 32–36)
MCV RBC AUTO: 76 FL (ref 82–98)
MONOCYTES # BLD AUTO: 1 K/UL (ref 0.3–1)
MONOCYTES NFR BLD: 10.1 % (ref 4–15)
NEUTROPHILS # BLD AUTO: 6.8 K/UL (ref 1.8–7.7)
NEUTROPHILS NFR BLD: 69.1 % (ref 38–73)
NRBC BLD-RTO: 0 /100 WBC
PLATELET # BLD AUTO: 515 K/UL (ref 150–450)
PMV BLD AUTO: 10.3 FL (ref 9.2–12.9)
POTASSIUM SERPL-SCNC: 3.9 MMOL/L (ref 3.5–5.1)
RBC # BLD AUTO: 3.21 M/UL (ref 4–5.4)
SATURATED IRON: 5 % (ref 20–50)
SODIUM SERPL-SCNC: 139 MMOL/L (ref 136–145)
TOTAL IRON BINDING CAPACITY: 309 UG/DL (ref 250–450)
TRANSFERRIN SERPL-MCNC: 209 MG/DL (ref 200–375)
WBC # BLD AUTO: 9.79 K/UL (ref 3.9–12.7)

## 2023-07-21 PROCEDURE — 36415 COLL VENOUS BLD VENIPUNCTURE: CPT | Performed by: NURSE PRACTITIONER

## 2023-07-21 PROCEDURE — 96361 HYDRATE IV INFUSION ADD-ON: CPT

## 2023-07-21 PROCEDURE — 85025 COMPLETE CBC W/AUTO DIFF WBC: CPT | Performed by: NURSE PRACTITIONER

## 2023-07-21 PROCEDURE — 80048 BASIC METABOLIC PNL TOTAL CA: CPT | Performed by: NURSE PRACTITIONER

## 2023-07-21 PROCEDURE — 25000003 PHARM REV CODE 250: Performed by: NURSE PRACTITIONER

## 2023-07-21 PROCEDURE — G0378 HOSPITAL OBSERVATION PER HR: HCPCS

## 2023-07-21 PROCEDURE — 84466 ASSAY OF TRANSFERRIN: CPT | Performed by: NURSE PRACTITIONER

## 2023-07-21 PROCEDURE — 82728 ASSAY OF FERRITIN: CPT | Performed by: NURSE PRACTITIONER

## 2023-07-21 RX ORDER — CIPROFLOXACIN 500 MG/1
500 TABLET ORAL EVERY 12 HOURS
Qty: 20 TABLET | Refills: 0 | Status: SHIPPED | OUTPATIENT
Start: 2023-07-21 | End: 2023-07-31

## 2023-07-21 RX ORDER — FERROUS SULFATE 325(65) MG
325 TABLET ORAL
Qty: 30 TABLET | Refills: 11 | Status: SHIPPED | OUTPATIENT
Start: 2023-07-21

## 2023-07-21 RX ORDER — CIPROFLOXACIN 500 MG/1
500 TABLET ORAL EVERY 12 HOURS
Status: DISCONTINUED | OUTPATIENT
Start: 2023-07-21 | End: 2023-07-21 | Stop reason: HOSPADM

## 2023-07-21 RX ADMIN — SODIUM CHLORIDE: 9 INJECTION, SOLUTION INTRAVENOUS at 02:07

## 2023-07-21 RX ADMIN — CIPROFLOXACIN HYDROCHLORIDE 500 MG: 500 TABLET, FILM COATED ORAL at 10:07

## 2023-07-21 NOTE — PLAN OF CARE
D/C recs noted. Pt to have [facility] follow up. Pharmacist will go over home medications and reasons for medications. VN and bedside nurse to reiterate final discharge instructions.       At time of discharge pt will be transported home by family.    DME at discharge: none  Home Health: none    Pt has follow up appointments added to AVS.         07/21/23 0821   Final Note   Assessment Type Final Discharge Note   Anticipated Discharge Disposition Home   What phone number can be called within the next 1-3 days to see how you are doing after discharge? [phone]   Hospital Resources/Appts/Education Provided Post-Acute resouces added to AVS   Post-Acute Status   Discharge Delays None known at this time       Future Appointments   Date Time Provider Department Center   7/31/2023  3:00 PM Alley Bradford MD [facility]  [phone]

## 2023-07-21 NOTE — PROGRESS NOTES
Discharge orders noted. Additional clinical references attached. Patient's discharge instructions given by bedside RN and reviewed via this VN.  Education provided on new medication, diagnosis, and follow-up appointments.  Teach back method used. Patient verbalized understanding. All questions answered. Transport to [facility] requested. Floor nurse notified.      07/21/23 1510   AVS Confirmation   Discharge instructions and AVS given to and reviewed with patient and/or significant other. Yes

## 2023-07-21 NOTE — DISCHARGE SUMMARY
[facility] Medicine  Discharge Summary      Patient Name: Miguel Floyd  MRN: [id number]  ALEX: [id number]  Patient Class: IP- Inpatient  Admission Date: 7/18/2023  Hospital Length of Stay: 2 days  Discharge Date and Time:  07/21/2023 3:59 PM  Attending Physician: Larry Finley, *   Discharging Provider: Cheryl Salazar NP  Primary Care Provider: Roselyn Mckeon NP    Primary Care Team: Networked reference to record PCT     HPI:   Miguel Floyd is a 33-year-old female without significant PMH. She presented to the ED with headache, fever, vomiting, weakness and numbness in her legs, and b/l flank pain.  Patient says symptoms initially started Friday, got better,  but then returned yesterday more severely.  Patient describes her leg numbness as tingling with some pain associated with it.  She took Tylenol for fever of 103F.  Denies sore throat, chest pain, shortness of breath.  Patient says she is on her menstrual cycle now so has abdominal cramping.  No vaginal discharge otherwise. Denies dysuria or other urinary symptoms.  No rash.  No loss of control of bowels or bladder.          * No surgery found *      Hospital Course:   Patient monitored closely during hospital. She was initiated on IV rocephin for UTI. CT renal protocol demonstrated mild stranding consistent with pyelonephritis.  She received antipyretics for fever and IV fluids. Urine culture obtained.  7/20---fevers noted over night--will cont abx. Follow cultures.   7/21--noted with a low grade temp of 99.5 but overall doing well. Cultures reviewed and noted with cipro sensitive. Will DC with oral cipro 500 mg bid x10 days. Will setup priority care appointment outpatient.     UTI (urinary tract infection)  Suspect pyelonephritis. Associated with suprapubic and flank pain, fever, and chills  WBC 19.47-> 18-> 14-> 9  UA positive for 2+ occult blood, 25 WBC, moderate bacteria, and occasional WBC clumps  Initially  started on vanc and rocephin in ED  Stopped rocephin after sensitivities reviewed--will DC home with Cipro 500 mg PO bid        Acute blood loss anemia     Patient reported being on her menstrual cycle per chart review  No excessive NSAID use at home  Denies blood in stools  Checking iron, TIBC, and ferritin   Transfuse for hgb <7  Monitor  Add po fergon daily on DC  Will follow up with the priority care clinic outpatient       Goals of Care Treatment Preferences:  Code Status: Full Code      Consults:     No new Assessment & Plan notes have been filed under this hospital service since the last note was generated.  Service: Hospital Medicine    Final Active Diagnoses:    Diagnosis Date Noted POA    PRINCIPAL PROBLEM:  Pyelonephritis [N12] 07/18/2023 Yes    Acute blood loss anemia [D62] 07/19/2023 Yes      Problems Resolved During this Admission:    Diagnosis Date Noted Date Resolved POA    Hypokalemia [E87.6] 07/18/2023 07/21/2023 Yes       Discharged Condition: stable    Disposition: Home or Self Care    Follow Up:   Follow-up Information       Roselyn Mckeon NP. Schedule an appointment as soon as possible for a visit .    Specialty: Family Medicine  Contact information:  [address]  [address]  [facility] [id number] [phone]                           Patient Instructions:      Ambulatory referral/consult to [facility]   Standing Status: Future   Referral Priority: Routine Referral Type: Consultation   Referral Reason: Specialty Services Required   Number of Visits Requested: 1     Diet Adult Regular     Notify your health care provider if you experience any of the following:  temperature >100.4     Notify your health care provider if you experience any of the following:  persistent nausea and vomiting or diarrhea     Notify your health care provider if you experience any of the following:  redness, tenderness, or signs of infection (pain, swelling, redness, odor or green/yellow  discharge around incision site)     Notify your health care provider if you experience any of the following:  difficulty breathing or increased cough     Notify your health care provider if you experience any of the following:  severe persistent headache     Notify your health care provider if you experience any of the following:  persistent dizziness, light-headedness, or visual disturbances     Notify your health care provider if you experience any of the following:  increased confusion or weakness     Activity as tolerated       Significant Diagnostic Studies: Labs: All labs within the past 24 hours have been reviewed    Pending Diagnostic Studies:       Procedure Component Value Units Date/Time    Iron and TIBC [[id number]] Collected: 07/21/23 0744    Order Status: Sent Lab Status: In process Updated: 07/21/23 0744    Specimen: Blood            Medications:  Reconciled Home Medications:      Medication List        START taking these medications      ciprofloxacin HCl 500 MG tablet  Commonly known as: CIPRO  Take 1 tablet (500 mg total) by mouth every 12 (twelve) hours. for 10 days     FeroSuL 325 mg (65 mg iron) Tab tablet  Generic drug: ferrous sulfate  Take 1 tablet (325 mg total) by mouth daily with breakfast.              Indwelling Lines/Drains at time of discharge:   Lines/Drains/Airways       None                   Time spent on the discharge of patient: 35 minutes         Cheryl Salazar NP  Department of Hospital Medicine  [facility]

## 2023-07-23 LAB
BACTERIA BLD CULT: NORMAL
BACTERIA BLD CULT: NORMAL

## 2023-10-23 PROBLEM — N12 PYELONEPHRITIS: Status: RESOLVED | Noted: 2023-07-18 | Resolved: 2023-10-23
